# Patient Record
Sex: FEMALE | Race: WHITE | Employment: OTHER | URBAN - NONMETROPOLITAN AREA
[De-identification: names, ages, dates, MRNs, and addresses within clinical notes are randomized per-mention and may not be internally consistent; named-entity substitution may affect disease eponyms.]

---

## 2017-02-02 ENCOUNTER — OFFICE VISIT (OUTPATIENT)
Dept: FAMILY MEDICINE | Facility: OTHER | Age: 82
End: 2017-02-02
Attending: FAMILY MEDICINE
Payer: MEDICARE

## 2017-02-02 VITALS
SYSTOLIC BLOOD PRESSURE: 134 MMHG | TEMPERATURE: 97.6 F | RESPIRATION RATE: 16 BRPM | DIASTOLIC BLOOD PRESSURE: 72 MMHG | WEIGHT: 127.2 LBS | HEART RATE: 73 BPM | OXYGEN SATURATION: 95 % | HEIGHT: 62 IN | BODY MASS INDEX: 23.41 KG/M2

## 2017-02-02 DIAGNOSIS — I10 ESSENTIAL HYPERTENSION: ICD-10-CM

## 2017-02-02 DIAGNOSIS — F03.90 DEMENTIA WITHOUT BEHAVIORAL DISTURBANCE, UNSPECIFIED DEMENTIA TYPE: Primary | ICD-10-CM

## 2017-02-02 DIAGNOSIS — Z51.5 END OF LIFE CARE: ICD-10-CM

## 2017-02-02 LAB
ALBUMIN SERPL-MCNC: 3.7 G/DL (ref 3.4–5)
ALBUMIN UR-MCNC: NEGATIVE MG/DL
ALP SERPL-CCNC: 78 U/L (ref 40–150)
ALT SERPL W P-5'-P-CCNC: 19 U/L (ref 0–50)
ANION GAP SERPL CALCULATED.3IONS-SCNC: 8 MMOL/L (ref 3–14)
APPEARANCE UR: CLEAR
AST SERPL W P-5'-P-CCNC: 18 U/L (ref 0–45)
BASOPHILS # BLD AUTO: 0 10E9/L (ref 0–0.2)
BASOPHILS NFR BLD AUTO: 0.4 %
BILIRUB SERPL-MCNC: 0.5 MG/DL (ref 0.2–1.3)
BILIRUB UR QL STRIP: NEGATIVE
BUN SERPL-MCNC: 18 MG/DL (ref 7–30)
CALCIUM SERPL-MCNC: 8.9 MG/DL (ref 8.5–10.1)
CHLORIDE SERPL-SCNC: 106 MMOL/L (ref 94–109)
CO2 SERPL-SCNC: 26 MMOL/L (ref 20–32)
COLOR UR AUTO: YELLOW
CREAT SERPL-MCNC: 0.77 MG/DL (ref 0.52–1.04)
DIFFERENTIAL METHOD BLD: NORMAL
EOSINOPHIL # BLD AUTO: 0.2 10E9/L (ref 0–0.7)
EOSINOPHIL NFR BLD AUTO: 3.8 %
ERYTHROCYTE [DISTWIDTH] IN BLOOD BY AUTOMATED COUNT: 13.3 % (ref 10–15)
GFR SERPL CREATININE-BSD FRML MDRD: 71 ML/MIN/1.7M2
GLUCOSE SERPL-MCNC: 96 MG/DL (ref 70–99)
GLUCOSE UR STRIP-MCNC: NEGATIVE MG/DL
HCT VFR BLD AUTO: 37 % (ref 35–47)
HGB BLD-MCNC: 12.2 G/DL (ref 11.7–15.7)
HGB UR QL STRIP: NEGATIVE
KETONES UR STRIP-MCNC: NEGATIVE MG/DL
LEUKOCYTE ESTERASE UR QL STRIP: NEGATIVE
LYMPHOCYTES # BLD AUTO: 1.4 10E9/L (ref 0.8–5.3)
LYMPHOCYTES NFR BLD AUTO: 26.1 %
MCH RBC QN AUTO: 30.2 PG (ref 26.5–33)
MCHC RBC AUTO-ENTMCNC: 33 G/DL (ref 31.5–36.5)
MCV RBC AUTO: 92 FL (ref 78–100)
MONOCYTES # BLD AUTO: 0.5 10E9/L (ref 0–1.3)
MONOCYTES NFR BLD AUTO: 9.5 %
NEUTROPHILS # BLD AUTO: 3.3 10E9/L (ref 1.6–8.3)
NEUTROPHILS NFR BLD AUTO: 60.2 %
NITRATE UR QL: NEGATIVE
PH UR STRIP: 6 PH (ref 5–7)
PLATELET # BLD AUTO: 185 10E9/L (ref 150–450)
POTASSIUM SERPL-SCNC: 3.8 MMOL/L (ref 3.4–5.3)
PROT SERPL-MCNC: 7.2 G/DL (ref 6.8–8.8)
RBC # BLD AUTO: 4.04 10E12/L (ref 3.8–5.2)
SODIUM SERPL-SCNC: 140 MMOL/L (ref 133–144)
SP GR UR STRIP: 1.02 (ref 1–1.03)
URN SPEC COLLECT METH UR: NORMAL
UROBILINOGEN UR STRIP-ACNC: 0.2 EU/DL (ref 0.2–1)
WBC # BLD AUTO: 5.5 10E9/L (ref 4–11)

## 2017-02-02 PROCEDURE — 80053 COMPREHEN METABOLIC PANEL: CPT | Performed by: FAMILY MEDICINE

## 2017-02-02 PROCEDURE — 99214 OFFICE O/P EST MOD 30 MIN: CPT | Performed by: FAMILY MEDICINE

## 2017-02-02 PROCEDURE — 36415 COLL VENOUS BLD VENIPUNCTURE: CPT | Performed by: FAMILY MEDICINE

## 2017-02-02 PROCEDURE — 85025 COMPLETE CBC W/AUTO DIFF WBC: CPT | Performed by: FAMILY MEDICINE

## 2017-02-02 PROCEDURE — 81003 URINALYSIS AUTO W/O SCOPE: CPT | Performed by: FAMILY MEDICINE

## 2017-02-02 PROCEDURE — 99212 OFFICE O/P EST SF 10 MIN: CPT

## 2017-02-02 NOTE — MR AVS SNAPSHOT
"              After Visit Summary   2/2/2017    Tai aGrrett    MRN: [id number]           Patient Information     Date Of Birth          11/24/1927        Visit Information        Provider Department      2/2/2017 9:45 AM Yousuf Sanchez MD [facility]        Today's Diagnoses     Dementia without behavioral disturbance, unspecified dementia type    -  1     Essential hypertension         End of life care           Care Instructions    F/u with ongoing concerns.         Follow-ups after your visit        Who to contact     If you have questions or need follow up information about today's clinic visit or your schedule please contact [facility] directly at [phone].  Normal or non-critical lab and imaging results will be communicated to you by MyChart, letter or phone within 4 business days after the clinic has received the results. If you do not hear from us within 7 days, please contact the clinic through MyChart or phone. If you have a critical or abnormal lab result, we will notify you by phone as soon as possible.  Submit refill requests through [employer] or call your pharmacy and they will forward the refill request to us. Please allow 3 business days for your refill to be completed.          Additional Information About Your Visit        Care EveryWhere ID     This is your Care EveryWhere ID. This could be used by other organizations to access your [place] medical records  [id number]        Your Vitals Were     Pulse Temperature Respirations Height BMI (Body Mass Index) Pulse Oximetry    73 97.6  F (36.4  C) (Tympanic) 16 5' 2\" (1.575 m) 23.26 kg/m2 95%       Blood Pressure from Last 3 Encounters:   02/02/17 134/72   11/25/16 162/100   08/24/16 134/84    Weight from Last 3 Encounters:   02/02/17 127 lb 3.2 oz (57.698 kg)   08/24/16 136 lb (61.689 kg)   05/31/16 141 lb (63.957 kg)              We Performed the Following     *UA reflex to Microscopic and Culture     CBC " with platelets and differential     Comprehensive metabolic panel (BMP + Alb, Alk Phos, ALT, AST, Total. Bili, TP)     DNR/DNI        Primary Care Provider Office Phone # Fax #    Yousuf Sanchez [phone]       [facility] [address]  [facility] [address]        Thank you!     Thank you for choosing [facility]  for your care. Our goal is always to provide you with excellent care. Hearing back from our patients is one way we can continue to improve our services. Please take a few minutes to complete the written survey that you may receive in the mail after your visit with us. Thank you!             Your Updated Medication List - Protect others around you: Learn how to safely use, store and throw away your medicines at www.disposemymeds.org.          This list is accurate as of: 2/2/17 12:34 PM.  Always use your most recent med list.                   Brand Name Dispense Instructions for use    fluticasone 50 MCG/ACT spray    FLONASE    16 g    Spray 2 sprays into both nostrils daily       ZANTAC PO      Take by mouth daily

## 2017-02-02 NOTE — PROGRESS NOTES
Tia Garrett    February 2, 2017    Chief Complaint   Patient presents with     Memory Loss     Pt is having increased trouble with memory.     Abdominal Pain     Pt has daily GI problems. Pt has pian today, but does not usually have pain.     Derm Problem     Pt has dry sknin on her hands.       SUBJECTIVE:  Here for a discussion.  Memory has been worsening.  Lives in adjacent home to son and wife.  Long discussion today.  She cannot tell me the season, president, or town.  She does not drive.  Failed medication for memory attempts in past.  Having some on and off abdominal pains, relatively mild.  Had been leaving gas on on stove, and now they have LP alarm present.  Overall just worsening.  See below.      Past Medical History   Diagnosis Date     Hiatal hernia        Past Surgical History   Procedure Laterality Date     Appendectomy open       at age 16     Hysterectomy total abdominal, bilateral salpingo-oophorectomy, combined       Orthopedic surgery  07/02/2015     right hip surgery       Current Outpatient Prescriptions   Medication Sig Dispense Refill     RaNITidine HCl (ZANTAC PO) Take by mouth daily       fluticasone (FLONASE) 50 MCG/ACT nasal spray Spray 2 sprays into both nostrils daily 16 g 3       No Known Allergies    Family History   Problem Relation Age of Onset     CANCER Father      lung cancer     Heart Failure Mother        Social History     Social History     Marital Status:      Spouse Name: N/A     Number of Children: N/A     Years of Education: N/A     Occupational History     Not on file.     Social History Main Topics     Smoking status: Never Smoker      Smokeless tobacco: Never Used     Alcohol Use: No     Drug Use: No     Sexual Activity: Not on file     Other Topics Concern     Parent/Sibling W/ Cabg, Mi Or Angioplasty Before 65f 55m? No     Social History Narrative       5 point ROS negative except as noted above in HPI, including Gen., Resp., CV, GI &  system review.      OBJECTIVE:  B/P: 134/72, T: 97.6, P: 73, R: 16    GENERAL APPEARANCE: Alert, no acute distress  CV: regular rate and rhythm, no murmur, rub or gallop  RESP: lungs clear to auscultation bilaterally  ABDOMEN: normal bowel sounds, soft, nontender, no hepatosplenomegaly or other masses  SKIN: no suspicious lesions or rashes to visualized skin  NEURO: Alert, not oriented ([place], [place], no idea who the president is or was recently), speech normal.     ASSESSMENT and PLAN:  (F03.90) Dementia without behavioral disturbance, unspecified dementia type  (primary encounter diagnosis)  Comment: ongoing, progressing.    Plan: *UA reflex to Microscopic and Culture, CBC with        platelets and differential, Comprehensive         metabolic panel (BMP + Alb, Alk Phos, ALT, AST,        Total. Bili, TP)        R/o medical cause with labs.  Discussed abdominal pain not clear dx and may need further testing.  For now, simply continue to follow.      (I10) Essential hypertension  Comment: stable.   Plan: n dennis.      (Z51.5) End of life care  Comment: discussed at some length.    Plan: she is very clear, as is daughter in law.  They had a form filled out but it was invalid and now they are going to take care of the paperwork.  She is DNR/DNI.  She would not necessarily be competent right now to make that distinction, but has expressed DNR wishes for several years, and is very clear on this referencing her Hoahaoism (when the good lord wants me to come home I want to go).  This is very clearly appropriate and family desires this as well based on her wishes.   I made her dnr in the orders in the chart.

## 2017-02-10 DIAGNOSIS — K29.70 GASTRITIS: Primary | ICD-10-CM

## 2017-02-11 ENCOUNTER — HOSPITAL ENCOUNTER (EMERGENCY)
Facility: HOSPITAL | Age: 82
Discharge: HOME | End: 2017-02-11
Attending: NURSE PRACTITIONER | Admitting: NURSE PRACTITIONER
Payer: MEDICARE

## 2017-02-11 VITALS
SYSTOLIC BLOOD PRESSURE: 121 MMHG | RESPIRATION RATE: 18 BRPM | DIASTOLIC BLOOD PRESSURE: 79 MMHG | TEMPERATURE: 97.4 F | OXYGEN SATURATION: 94 %

## 2017-02-11 DIAGNOSIS — H04.301 LACRIMAL DUCT INFECTION, RIGHT: ICD-10-CM

## 2017-02-11 PROCEDURE — 99213 OFFICE O/P EST LOW 20 MIN: CPT | Performed by: NURSE PRACTITIONER

## 2017-02-11 PROCEDURE — 99213 OFFICE O/P EST LOW 20 MIN: CPT

## 2017-02-11 RX ORDER — POLYMYXIN B SULFATE AND TRIMETHOPRIM 1; 10000 MG/ML; [USP'U]/ML
2 SOLUTION OPHTHALMIC 4 TIMES DAILY
Qty: 1 BOTTLE | Refills: 0 | Status: SHIPPED | OUTPATIENT
Start: 2017-02-11 | End: 2017-07-31

## 2017-02-11 ASSESSMENT — ENCOUNTER SYMPTOMS
PHOTOPHOBIA: 0
APPETITE CHANGE: 0
DIAPHORESIS: 0
SINUS PRESSURE: 0
FEVER: 0
COUGH: 0
EYE REDNESS: 0
RHINORRHEA: 0
SORE THROAT: 0
EYE PAIN: 0
FATIGUE: 0

## 2017-02-11 NOTE — ED AVS SNAPSHOT
[facility]    [address] [address]    Phone:  [phone]                                       Tia Garrett   MRN: [id number]    Department:  [facility]   Date of Visit:  2/11/2017           After Visit Summary Signature Page     I have received my discharge instructions, and my questions have been answered. I have discussed any challenges I see with this plan with the nurse or doctor.    ..........................................................................................................................................  Patient/Patient Representative Signature      ..........................................................................................................................................  Patient Representative Print Name and Relationship to Patient    ..................................................               ................................................  Date                                            Time    ..........................................................................................................................................  Reviewed by Signature/Title    ...................................................              ..............................................  Date                                                            Time

## 2017-02-11 NOTE — ED NOTES
Right eye bothersome starting yesterday. Red swelling to inner corner of eye. No pain or itching, denies any vision changes. Discomfort to swelling outside of eye.

## 2017-02-11 NOTE — ED AVS SNAPSHOT
[facility]    [address] [address]    Phone:  [phone]                                       Tia Garrett   MRN: [id number]    Department:  [facility]   Date of Visit:  2/11/2017           Patient Information     Date Of Birth          11/24/1927        Your diagnoses for this visit were:     Lacrimal duct infection, right        You were seen by Suzie Red NP.      Follow-up Information     Follow up with [facility].    Specialty:  EMERGENCY MEDICINE    Why:  As needed, If symptoms worsen, or concerns develop    Contact information:    [address] [address] [phone]    Additional information:    From [place]: Take [address]. Turn left at [address]. Turn left at the first stoplight on [address]. At the first stop sign, take a right onto [address]. Take a left into the parking lot and continue through until you reach the North enterance of the building.       From [place]: Take [address]. Take the [address] ramp towards [place]. Turn left onto [address]. Take a slight right onto [address]. Turn left at the first stoplight on [address]. At the first stop sign, take a right onto [address]. Take a left into the parking lot and continue through until you reach the North enterance of the building.       From [place]: Take [address] South. Take a right at [address]. At the first stop sign, take a right onto [address]. Take a left into the parking lot and continue through until you reach the North enterance of the building.         Follow up with Yousuf Sanchez MD.    Specialty:  Family Practice    Why:  As needed, if symptoms do not improve    Contact information:    [address]  [place] [address]  [phone]          Discharge Instructions         Infected Tear Sac  (Dacryocystitis)    Dacryocystitis is an infection of the tear sac. The tear sac is the narrow pouch where tears from the eye drain before they flow into the nose. You have 1 tear sac for each eye.  Tears moisten and clean the eyes. They are made in a gland under the upper eyelids. Tiny tubes called tear ducts drain excess tears away from the eyes. The tears flow into the tear sacs, and then into the nose.  In some cases, a tear sac can get infected. This can happen if the tear duct is blocked. A blocked duct can happen for many reasons. It may be caused by an injury to the nose or eye. Or the duct may have narrowed on its own. A blocked tear duct can t drain tears. Bacteria may then become trapped in the duct and in the tear sac.  Dacryocystitis can cause redness, swelling, and pain just below the lower lid, near the nose. A fluid (pus) may drain from the eye. Antibiotics are used to treat the infection and stop it from spreading. If the infection doesn t go away, or comes back often, minor surgery may be needed to open the duct.  Home care  Follow these guidelines when caring for yourself at home:    You will be given antibiotic medicine to treat the infection. Follow all instructions for taking this medication. It may be taken by mouth. Or it may be eye drops or eye ointment. If you have pain, you may use acetaminophen or ibuprofen to reduce pain and fever. (Note: If you have chronic liver or kidney disease, or you have ever had a stomach ulcer or gastrointestinal bleeding, talk with your health care provider before using these medicines.)    Wash your hands before caring for your eye.    Several times a day, apply a warm compress for 1 to 2 minutes. A warm compress is a clean towel damp with warm water.    After the warm compress, massage the tear sac with clean hands. Place your index finger between the corner of the eye and the nose. Your finger should be pointing toward the top of the nose. Gently massage  from the nose toward the eye. A small amount of tear fluid or pus may appear in the corner of the eye.  Using eye drops  Apply drops in the corner of the eye, where the eyelid meets the nose. The drops will pool in this area. When you blink or open your eyelid, the drops will flow into the eye. Use the exact number of drops prescribed. Be careful not to touch your eye or eyelashes with the dropper.  Using ointment  If both drops and ointment are prescribed, use the drops first. Wait 3 minutes, and then apply the ointment. Doing this will give each medicine time to work. To apply the ointment, start by gently pulling down your lower lid. Place a thin line of ointment along the inside of the lid. Begin at the nose and move outward. Close the lid. Wipe away excess medicine from the nose area outward. This is to keep the eyes as clean as possible. Keep your eye closed for 1 or 2 minutes so the medicine has time to coat the eye. Eye ointment may cause blurry vision. This is normal. It may help to apply ointment at bedtime instead of during the day.  Follow-up care  Follow up with your health care provider, or as advised.  When to seek medical advice  Call your health care provider right away if any of these occur.    Fever of 100.4 F (38 C) or higher after 2 days of antibiotic treatment    You are pregnant    You just had surgery or another medical procedure, or were just discharged from the hospital    Symptoms don t get better, or get worse    8171-6624 [employer]. [address] [place] [address], [place] [address]. All rights reserved. This information is not intended as a substitute for professional medical care. Always follow your healthcare professional's instructions.             Review of your medicines      START taking        Dose / Directions Last dose taken    amoxicillin-clavulanate 875-125 MG per tablet   Commonly known as:  AUGMENTIN   Dose:  1 tablet   Quantity:  20 tablet        Take 1 tablet by  mouth 2 times daily for 10 days   Refills:  0        trimethoprim-polymyxin b ophthalmic solution   Commonly known as:  POLYTRIM   Dose:  2 drop   Quantity:  1 Bottle        Place 2 drops Into the left eye 4 times daily   Refills:  0          Our records show that you are taking the medicines listed below. If these are incorrect, please call your family doctor or clinic.        Dose / Directions Last dose taken    fluticasone 50 MCG/ACT spray   Commonly known as:  FLONASE   Dose:  2 spray   Quantity:  16 g        Spray 2 sprays into both nostrils daily   Refills:  3        ZANTAC PO        Take by mouth daily   Refills:  0                Prescriptions were sent or printed at these locations (2 Prescriptions)                   [facility] - [place], [place] - [address] AT [facility] [address], [place] [address]    Telephone:  [phone]   Fax:  [phone]   Hours:                  E-Prescribed (2 of 2)         amoxicillin-clavulanate (AUGMENTIN) 875-125 MG per tablet               trimethoprim-polymyxin b (POLYTRIM) ophthalmic solution                Orders Needing Specimen Collection     None      Pending Results     No orders found from 2/10/2017 to 2/12/2017.            Pending Culture Results     No orders found from 2/10/2017 to 2/12/2017.            Thank you for choosing [facility]       Thank you for choosing [facility] for your care. Our goal is always to provide you with excellent care. Hearing back from our patients is one way we can continue to improve our services. Please take a few minutes to complete the written survey that you may receive in the mail after you visit with us. Thank you!        Care EveryWhere ID     This is your Care EveryWhere ID. This could be used by other organizations to access your [facility] medical records  [id number]        After Visit Summary       This is your record. Keep this with you and show to your community pharmacist(s) and  doctor(s) at your next visit.

## 2017-02-11 NOTE — DISCHARGE INSTRUCTIONS
Infected Tear Sac (Dacryocystitis)    Dacryocystitis is an infection of the tear sac. The tear sac is the narrow pouch where tears from the eye drain before they flow into the nose. You have 1 tear sac for each eye.  Tears moisten and clean the eyes. They are made in a gland under the upper eyelids. Tiny tubes called tear ducts drain excess tears away from the eyes. The tears flow into the tear sacs, and then into the nose.  In some cases, a tear sac can get infected. This can happen if the tear duct is blocked. A blocked duct can happen for many reasons. It may be caused by an injury to the nose or eye. Or the duct may have narrowed on its own. A blocked tear duct can t drain tears. Bacteria may then become trapped in the duct and in the tear sac.  Dacryocystitis can cause redness, swelling, and pain just below the lower lid, near the nose. A fluid (pus) may drain from the eye. Antibiotics are used to treat the infection and stop it from spreading. If the infection doesn t go away, or comes back often, minor surgery may be needed to open the duct.  Home care  Follow these guidelines when caring for yourself at home:    You will be given antibiotic medicine to treat the infection. Follow all instructions for taking this medication. It may be taken by mouth. Or it may be eye drops or eye ointment. If you have pain, you may use acetaminophen or ibuprofen to reduce pain and fever. (Note: If you have chronic liver or kidney disease, or you have ever had a stomach ulcer or gastrointestinal bleeding, talk with your health care provider before using these medicines.)    Wash your hands before caring for your eye.    Several times a day, apply a warm compress for 1 to 2 minutes. A warm compress is a clean towel damp with warm water.    After the warm compress, massage the tear sac with clean hands. Place your index finger between the corner of the eye and the nose. Your finger should be pointing toward the top of the  nose. Gently massage from the nose toward the eye. A small amount of tear fluid or pus may appear in the corner of the eye.  Using eye drops  Apply drops in the corner of the eye, where the eyelid meets the nose. The drops will pool in this area. When you blink or open your eyelid, the drops will flow into the eye. Use the exact number of drops prescribed. Be careful not to touch your eye or eyelashes with the dropper.  Using ointment  If both drops and ointment are prescribed, use the drops first. Wait 3 minutes, and then apply the ointment. Doing this will give each medicine time to work. To apply the ointment, start by gently pulling down your lower lid. Place a thin line of ointment along the inside of the lid. Begin at the nose and move outward. Close the lid. Wipe away excess medicine from the nose area outward. This is to keep the eyes as clean as possible. Keep your eye closed for 1 or 2 minutes so the medicine has time to coat the eye. Eye ointment may cause blurry vision. This is normal. It may help to apply ointment at bedtime instead of during the day.  Follow-up care  Follow up with your health care provider, or as advised.  When to seek medical advice  Call your health care provider right away if any of these occur.    Fever of 100.4 F (38 C) or higher after 2 days of antibiotic treatment    You are pregnant    You just had surgery or another medical procedure, or were just discharged from the hospital    Symptoms don t get better, or get worse    2895-6547 [employer]. [address] [place] [address], [place] [address]. All rights reserved. This information is not intended as a substitute for professional medical care. Always follow your healthcare professional's instructions.

## 2017-02-11 NOTE — ED PROVIDER NOTES
History     Chief Complaint   Patient presents with     Eye Infection     right eye with green matter to lower lid, tearing. denies pain. no vision changes. reddened and swelling noted to inner corner of right eye.      The history is provided by the patient. No  was used.     Tia Garrett is a 89 year old female who presents today with a CC of right inner canthus swelling and redness.  She denies injury to the area.  She denies tearing of the eye.  Swollen area is tender to palpation.  No fevers or chills.  Appetite has been good.  No acute vision changes to the right eye.      I have reviewed the Medications, Allergies, Past Medical and Surgical History, and Social History in the Epic system.    Review of Systems   Constitutional: Negative for fever, diaphoresis, appetite change and fatigue.   HENT: Negative for congestion, rhinorrhea, sinus pressure and sore throat.    Eyes: Negative for photophobia, pain, redness (no eyeball redness) and visual disturbance.   Respiratory: Negative for cough.        Physical Exam   BP: 121/79 mmHg  Heart Rate: 85  Temp: 97.4  F (36.3  C)  Resp: 18  SpO2: 94 %    Physical Exam   Constitutional: She appears well-developed and well-nourished.   HENT:   Head: Normocephalic and atraumatic.   Eyes: Conjunctivae and EOM are normal. Pupils are equal, round, and reactive to light.       Cardiovascular: Normal rate.    Pulmonary/Chest: Effort normal.   Neurological: She is alert.   Skin: Skin is warm and dry.   Psychiatric: She has a normal mood and affect. Her behavior is normal.   Nursing note and vitals reviewed.      ED Course   Procedures    Assessments & Plan (with Medical Decision Making)     I have reviewed the nursing notes.    I have reviewed the findings, diagnosis, plan and need for follow up with the patient.    Consulted with Dr Rocha, Ophthalmologist who advised warm compresses to right eye, start patient on Augmentin and abx eye drop, any drop  will be ok.  This is a lacrimal duct infection but duct appears patent, reassuring that duct is draining.   Patient can follow up with Ophthalmology, non-emergent in clinic this week.  Thank you Dr Rocha for your consultation.      ASSESSMENT / PLAN:  (H04.301) Lacrimal duct infection, right  Comment: advised patient and daughter of above recommendations  Plan:  Plan as above   Augmentin as prescribed   Patient and daughter verbally educated and given appropriate education sheets for their diagnoses and has no questions.   Take medications as directed.    Return to ED/UC if symptoms increase or concerns develop: red flag symptoms as discussed and per discharge instructions   Call Monday morning to schedule follow up with Dr Rocha in clinic this week.      Discharge Medication List as of 2/11/2017 10:53 AM      START taking these medications    Details   amoxicillin-clavulanate (AUGMENTIN) 875-125 MG per tablet Take 1 tablet by mouth 2 times daily for 10 days, Disp-20 tablet, R-0, E-Prescribe      trimethoprim-polymyxin b (POLYTRIM) ophthalmic solution Place 2 drops Into the left eye 4 times daily, Disp-1 Bottle, R-0, E-Prescribe             Final diagnoses:   Lacrimal duct infection, right       2/11/2017   [facility]       Suzie Red NP  02/14/17 0798

## 2017-04-29 ENCOUNTER — HOSPITAL ENCOUNTER (EMERGENCY)
Facility: HOSPITAL | Age: 82
Discharge: HOME | End: 2017-04-29
Payer: MEDICARE

## 2017-04-29 VITALS
RESPIRATION RATE: 16 BRPM | OXYGEN SATURATION: 97 % | SYSTOLIC BLOOD PRESSURE: 180 MMHG | HEART RATE: 82 BPM | DIASTOLIC BLOOD PRESSURE: 117 MMHG | TEMPERATURE: 97.7 F

## 2017-04-29 DIAGNOSIS — N39.0 UTI (URINARY TRACT INFECTION), UNCOMPLICATED: ICD-10-CM

## 2017-04-29 LAB
ALBUMIN SERPL-MCNC: 4.3 G/DL (ref 3.4–5)
ALBUMIN UR-MCNC: 100 MG/DL
ALP SERPL-CCNC: 86 U/L (ref 40–150)
ALT SERPL W P-5'-P-CCNC: 16 U/L (ref 0–50)
ANION GAP SERPL CALCULATED.3IONS-SCNC: 10 MMOL/L (ref 3–14)
APPEARANCE UR: ABNORMAL
AST SERPL W P-5'-P-CCNC: 18 U/L (ref 0–45)
BACTERIA #/AREA URNS HPF: ABNORMAL /HPF
BASOPHILS # BLD AUTO: 0 10E9/L (ref 0–0.2)
BASOPHILS NFR BLD AUTO: 0.3 %
BILIRUB SERPL-MCNC: 0.6 MG/DL (ref 0.2–1.3)
BILIRUB UR QL STRIP: NEGATIVE
BUN SERPL-MCNC: 20 MG/DL (ref 7–30)
CALCIUM SERPL-MCNC: 8.7 MG/DL (ref 8.5–10.1)
CHLORIDE SERPL-SCNC: 102 MMOL/L (ref 94–109)
CO2 SERPL-SCNC: 27 MMOL/L (ref 20–32)
COLOR UR AUTO: YELLOW
CREAT SERPL-MCNC: 0.8 MG/DL (ref 0.52–1.04)
DIFFERENTIAL METHOD BLD: NORMAL
EOSINOPHIL # BLD AUTO: 0.2 10E9/L (ref 0–0.7)
EOSINOPHIL NFR BLD AUTO: 2.8 %
ERYTHROCYTE [DISTWIDTH] IN BLOOD BY AUTOMATED COUNT: 12.9 % (ref 10–15)
GFR SERPL CREATININE-BSD FRML MDRD: 68 ML/MIN/1.7M2
GLUCOSE SERPL-MCNC: 109 MG/DL (ref 70–99)
GLUCOSE UR STRIP-MCNC: NEGATIVE MG/DL
HCT VFR BLD AUTO: 40.7 % (ref 35–47)
HGB BLD-MCNC: 13.6 G/DL (ref 11.7–15.7)
HGB UR QL STRIP: NEGATIVE
HYALINE CASTS #/AREA URNS LPF: 38 /LPF (ref 0–2)
IMM GRANULOCYTES # BLD: 0 10E9/L (ref 0–0.4)
IMM GRANULOCYTES NFR BLD: 0.3 %
KETONES UR STRIP-MCNC: NEGATIVE MG/DL
LEUKOCYTE ESTERASE UR QL STRIP: ABNORMAL
LYMPHOCYTES # BLD AUTO: 1.2 10E9/L (ref 0.8–5.3)
LYMPHOCYTES NFR BLD AUTO: 18.1 %
MCH RBC QN AUTO: 30.3 PG (ref 26.5–33)
MCHC RBC AUTO-ENTMCNC: 33.4 G/DL (ref 31.5–36.5)
MCV RBC AUTO: 91 FL (ref 78–100)
MONOCYTES # BLD AUTO: 0.5 10E9/L (ref 0–1.3)
MONOCYTES NFR BLD AUTO: 7.8 %
MUCOUS THREADS #/AREA URNS LPF: PRESENT /LPF
NEUTROPHILS # BLD AUTO: 4.7 10E9/L (ref 1.6–8.3)
NEUTROPHILS NFR BLD AUTO: 70.7 %
NITRATE UR QL: NEGATIVE
NRBC # BLD AUTO: 0 10*3/UL
NRBC BLD AUTO-RTO: 0 /100
PH UR STRIP: 6 PH (ref 4.7–8)
PLATELET # BLD AUTO: 241 10E9/L (ref 150–450)
POTASSIUM SERPL-SCNC: 3.5 MMOL/L (ref 3.4–5.3)
PROT SERPL-MCNC: 7.9 G/DL (ref 6.8–8.8)
RBC # BLD AUTO: 4.49 10E12/L (ref 3.8–5.2)
RBC #/AREA URNS AUTO: 2 /HPF (ref 0–2)
SODIUM SERPL-SCNC: 139 MMOL/L (ref 133–144)
SP GR UR STRIP: 1.01 (ref 1–1.03)
SQUAMOUS #/AREA URNS AUTO: 1 /HPF (ref 0–1)
URN SPEC COLLECT METH UR: ABNORMAL
UROBILINOGEN UR STRIP-MCNC: NORMAL MG/DL (ref 0–2)
WBC # BLD AUTO: 6.7 10E9/L (ref 4–11)
WBC #/AREA URNS AUTO: 5 /HPF (ref 0–2)

## 2017-04-29 PROCEDURE — 99284 EMERGENCY DEPT VISIT MOD MDM: CPT

## 2017-04-29 PROCEDURE — 96361 HYDRATE IV INFUSION ADD-ON: CPT

## 2017-04-29 PROCEDURE — 25000128 H RX IP 250 OP 636

## 2017-04-29 PROCEDURE — 99284 EMERGENCY DEPT VISIT MOD MDM: CPT | Mod: 25

## 2017-04-29 PROCEDURE — 96374 THER/PROPH/DIAG INJ IV PUSH: CPT

## 2017-04-29 PROCEDURE — 80053 COMPREHEN METABOLIC PANEL: CPT

## 2017-04-29 PROCEDURE — 25000132 ZZH RX MED GY IP 250 OP 250 PS 637: Mod: GY

## 2017-04-29 PROCEDURE — A9270 NON-COVERED ITEM OR SERVICE: HCPCS | Mod: GY

## 2017-04-29 PROCEDURE — 36415 COLL VENOUS BLD VENIPUNCTURE: CPT

## 2017-04-29 PROCEDURE — 85025 COMPLETE CBC W/AUTO DIFF WBC: CPT

## 2017-04-29 PROCEDURE — 81001 URINALYSIS AUTO W/SCOPE: CPT

## 2017-04-29 RX ORDER — NITROFURANTOIN 25; 75 MG/1; MG/1
100 CAPSULE ORAL 2 TIMES DAILY
Qty: 5 CAPSULE | Refills: 0 | Status: SHIPPED | OUTPATIENT
Start: 2017-04-29 | End: 2017-07-31

## 2017-04-29 RX ORDER — ACETAMINOPHEN 325 MG/1
650 TABLET ORAL ONCE
Status: COMPLETED | OUTPATIENT
Start: 2017-04-29 | End: 2017-04-29

## 2017-04-29 RX ORDER — SODIUM CHLORIDE 9 MG/ML
INJECTION, SOLUTION INTRAVENOUS CONTINUOUS
Status: DISCONTINUED | OUTPATIENT
Start: 2017-04-29 | End: 2017-04-29 | Stop reason: HOSPADM

## 2017-04-29 RX ORDER — NITROFURANTOIN 25; 75 MG/1; MG/1
100 CAPSULE ORAL ONCE
Status: COMPLETED | OUTPATIENT
Start: 2017-04-29 | End: 2017-04-29

## 2017-04-29 RX ORDER — ONDANSETRON 2 MG/ML
4 INJECTION INTRAMUSCULAR; INTRAVENOUS EVERY 30 MIN PRN
Status: DISCONTINUED | OUTPATIENT
Start: 2017-04-29 | End: 2017-04-29 | Stop reason: HOSPADM

## 2017-04-29 RX ADMIN — NITROFURANTOIN (MONOHYDRATE/MACROCRYSTALS) 100 MG: 75; 25 CAPSULE ORAL at 11:23

## 2017-04-29 RX ADMIN — ACETAMINOPHEN 650 MG: 325 TABLET, FILM COATED ORAL at 11:22

## 2017-04-29 RX ADMIN — SODIUM CHLORIDE: 9 INJECTION, SOLUTION INTRAVENOUS at 10:31

## 2017-04-29 RX ADMIN — ONDANSETRON 4 MG: 2 INJECTION INTRAMUSCULAR; INTRAVENOUS at 10:31

## 2017-04-29 ASSESSMENT — ENCOUNTER SYMPTOMS
NECK STIFFNESS: 0
APPETITE CHANGE: 1
ABDOMINAL PAIN: 0
DYSURIA: 1
FREQUENCY: 1
FEVER: 0
EYE REDNESS: 0
DIFFICULTY URINATING: 0
ARTHRALGIAS: 0
NAUSEA: 1
COLOR CHANGE: 0
HEADACHES: 0
BACK PAIN: 1
SHORTNESS OF BREATH: 0
CONFUSION: 0

## 2017-04-29 NOTE — ED NOTES
Discharge instructions completed with patient and care giver at bedside with no questions or concerns. Both individuals aware of prescription to be picked up. Vital signs stable prior to discharge.

## 2017-04-29 NOTE — DISCHARGE INSTRUCTIONS
"See attached for home care  Increase fluids  Tylenol for pain  F/U with PCP if not improving or back to base line in 1 week  Return to ED/UC with worsening symptoms.         * BLADDER INFECTION,Female (Adult)    A bladder infection (\"cystitis\" or \"UTI\") usually causes a constant urge to urinate and a burning when passing urine. Urine may be cloudy, smelly or dark. There may be pain in the lower abdomen. A bladder infection occurs when bacteria from the vaginal area enter the bladder opening (urethra). This can occur from sexual intercourse, wearing tight clothing, dehydration and other factors.  HOME CARE:  1. Drink lots of fluids (at least 6-8 glasses a day, unless you must restrict fluids for other medical reasons). This will force the medicine into your urinary system and flush the bacteria out of your body. Cranberry juice has been shown to help clear out the bacteria.  2. Avoid sexual intercourse until your symptoms are gone.  3. A bladder infection is treated with antibiotics. You may also be given Pyridium (generic = phenazopyridine) to reduce the burning sensation. This medicine will cause your urine to become a bright orange color. The orange urine may stain clothing. You may wear a pad or panty-liner to protect clothing.  PREVENTING FUTURE INFECTIONS:  1. Always wipe from front to back after a bowel movement.  2. Keep the genital area clean and dry.  3. Drink plenty of fluids each day to avoid dehydration.  4. Urinate right after intercourse to flush out the bladder.  5. Wear cotton underwear and cotton-lined panty hose; avoid tight-fitting pants.  6. If you are on birth control pills and are having frequent bladder infections, discuss with your doctor.  FOLLOW UP: Return to this facility or see your doctor if ALL symptoms are not gone after three days of treatment.  GET PROMPT MEDICAL ATTENTION if any of the following occur:    Fever over 101 F (38.3 C)    No improvement by the third day of " treatment    Increasing back or abdominal pain    Repeated vomiting; unable to keep medicine down    Weakness, dizziness or fainting    Vaginal discharge    Pain, redness or swelling in the labia (outer vaginal area)    7960-8279 [employer], [address] [place] [address], [place] [address]. All rights reserved. This information is not intended as a substitute for professional medical care. Always follow your healthcare professional's instructions.

## 2017-04-29 NOTE — ED AVS SNAPSHOT
[facility]    [address] [address]    Phone:  [phone]                                       Tia Garrett   MRN: [id number]    Department:  [facility]   Date of Visit:  4/29/2017           After Visit Summary Signature Page     I have received my discharge instructions, and my questions have been answered. I have discussed any challenges I see with this plan with the nurse or doctor.    ..........................................................................................................................................  Patient/Patient Representative Signature      ..........................................................................................................................................  Patient Representative Print Name and Relationship to Patient    ..................................................               ................................................  Date                                            Time    ..........................................................................................................................................  Reviewed by Signature/Title    ...................................................              ..............................................  Date                                                            Time

## 2017-04-29 NOTE — ED PROVIDER NOTES
History     Chief Complaint   Patient presents with     Nausea     c/o nausea and not feeling well since last night     Urinary Frequency     since last night     Back Pain     c/o low back pain     HPI  Tia PAM Garrett is a 89 year old female with dementia, hx of recurrent UTI, renal lithiasis, lives at home alone with family close by, presents to ED via private car with family for evaluation of urinary frequency, dysuria, low back pain, nausea. Daughter in law reports to mechanical trip and fall 5 days ago. States she slipped on ice, fell onto buttocks. No apparent injury, pain or deficit noted at the time. Onset of nausea, dysuria last night, progressing throughout the night. Denies fever, denies flank pain, denies vomiting, no diarrhea, no paresthesias. Pt poor historian.     I have reviewed the Medications, Allergies, Past Medical and Surgical History, and Social History in the Epic system.    Review of Systems   Constitutional: Positive for appetite change. Negative for fever.   HENT: Negative for congestion.    Eyes: Negative for redness.   Respiratory: Negative for shortness of breath.    Cardiovascular: Negative for chest pain.   Gastrointestinal: Positive for nausea. Negative for abdominal pain.   Genitourinary: Positive for dysuria, frequency and urgency. Negative for difficulty urinating.   Musculoskeletal: Positive for back pain. Negative for arthralgias and neck stiffness.   Skin: Negative for color change.   Neurological: Negative for headaches.   Psychiatric/Behavioral: Negative for confusion.       Physical Exam   BP: 140/92  Heart Rate: 93  Temp: 97.6  F (36.4  C)  Resp: 18  SpO2: 98 %  Physical Exam   Constitutional: She is oriented to person, place, and time. No distress.   HENT:   Head: Normocephalic and atraumatic.   Mouth/Throat: Oropharynx is clear and moist.   Eyes: Pupils are equal, round, and reactive to light.   Neck: Normal range of motion. No thyromegaly present.   Cardiovascular:  Normal rate and regular rhythm.    Pulmonary/Chest: Effort normal and breath sounds normal. No respiratory distress.   Abdominal: Soft. There is tenderness in the suprapubic area. There is no rebound, no guarding and no tenderness at McBurney's point.   Musculoskeletal: Normal range of motion. She exhibits tenderness.        Lumbar back: She exhibits tenderness. She exhibits no swelling.        Back:    Neurological: She is alert and oriented to person, place, and time. She has normal strength. No cranial nerve deficit or sensory deficit. GCS eye subscore is 4. GCS verbal subscore is 5. GCS motor subscore is 6.   Reflex Scores:       Patellar reflexes are 2+ on the right side and 2+ on the left side.  Skin: Skin is warm and dry. She is not diaphoretic.   Nursing note and vitals reviewed.      ED Course     Procedures           Labs Ordered and Resulted from Time of ED Arrival Up to the Time of Departure from the ED   UA MACROSCOPIC WITH REFLEX TO MICRO AND CULTURE - Abnormal; Notable for the following:        Result Value    Protein Albumin Urine 100 (*)     Leukocyte Esterase Urine Trace (*)     WBC Urine 5 (*)     Bacteria Urine Few (*)     Mucous Urine Present (*)     Hyaline Casts 38 (*)     All other components within normal limits   COMPREHENSIVE METABOLIC PANEL - Abnormal; Notable for the following:     Glucose 109 (*)     All other components within normal limits   CBC WITH PLATELETS DIFFERENTIAL       Assessments & Plan (with Medical Decision Making)   Pt presents with dysuria, frequency, 5 days s/p mechanical trip and fall. VSS, afebrile. NAD, ambulating well with walker, n/v status intact. Exam as above. Labs obtained, unremarkable. UA shows few bacteria, trace LE, 5 wbc.   Findings consistent with early UTI, lumbar sprain, strain.   Epic discharge instructions given. Pt discharged in stable condition.     I have reviewed the nursing notes.    I have reviewed the findings, diagnosis, plan and need for  follow up with the patient.    Discharge Medication List as of 4/29/2017 11:55 AM      START taking these medications    Details   nitrofurantoin, macrocrystal-monohydrate, (MACROBID) 100 MG capsule Take 1 capsule (100 mg) by mouth 2 times daily, Disp-5 capsule, R-0, E-Prescribe             Final diagnoses:   UTI (urinary tract infection), uncomplicated   See attached for home care  Increase fluids  Tylenol for pain  F/U with PCP if not improving or back to base line in 1 week  Return to ED/UC with worsening symptoms.     4/29/2017   [facility]     Renuka Cormier, CRESENCIO FNP  04/30/17 9221

## 2017-04-29 NOTE — ED AVS SNAPSHOT
[facility]    [address] [address]    Phone:  [phone]                                       Tai Garrett   MRN: [id number]    Department:  [facility]   Date of Visit:  4/29/2017           Patient Information     Date Of Birth          11/24/1927        Your diagnoses for this visit were:     UTI (urinary tract infection), uncomplicated        You were seen by Renuka Cormier APRN FNP.      Follow-up Information     Follow up with Yousuf Sanchez MD In 1 week.    Specialty:  Family Practice    Why:  if not improving or back to baseline    Contact information:     [facility]  [address] [place] [address] [phone]          Follow up with [facility].    Specialty:  EMERGENCY MEDICINE    Why:  As needed, If symptoms worsen    Contact information:    [address] [address] [phone]    Additional information:    From [facility]: Take [address]. Turn left at [address]. Turn left at the first stoplight on [address]. At the first stop sign, take a right onto [address]. Take a left into the parking lot and continue through until you reach the North enterance of the building.       From [place]: Take [address]. Take the [address] ramp towards [place]. Turn left onto [address]. Take a slight right onto [address]. Turn left at the first stoplight on [address]. At the first stop sign, take a right onto [address]. Take a left into the parking lot and continue through until you reach the North enterance of the building.       From [place]: Take [address]. Take a right at [address]. At the first stop sign, take a right onto [address]. Take a left into the parking lot and continue through until you reach the North enterance of the building.         Discharge Instructions       See attached for home  "care  Increase fluids  Tylenol for pain  F/U with PCP if not improving or back to base line in 1 week  Return to ED/UC with worsening symptoms.         * BLADDER INFECTION,Female (Adult)    A bladder infection (\"cystitis\" or \"UTI\") usually causes a constant urge to urinate and a burning when passing urine. Urine may be cloudy, smelly or dark. There may be pain in the lower abdomen. A bladder infection occurs when bacteria from the vaginal area enter the bladder opening (urethra). This can occur from sexual intercourse, wearing tight clothing, dehydration and other factors.  HOME CARE:  1. Drink lots of fluids (at least 6-8 glasses a day, unless you must restrict fluids for other medical reasons). This will force the medicine into your urinary system and flush the bacteria out of your body. Cranberry juice has been shown to help clear out the bacteria.  2. Avoid sexual intercourse until your symptoms are gone.  3. A bladder infection is treated with antibiotics. You may also be given Pyridium (generic = phenazopyridine) to reduce the burning sensation. This medicine will cause your urine to become a bright orange color. The orange urine may stain clothing. You may wear a pad or panty-liner to protect clothing.  PREVENTING FUTURE INFECTIONS:  1. Always wipe from front to back after a bowel movement.  2. Keep the genital area clean and dry.  3. Drink plenty of fluids each day to avoid dehydration.  4. Urinate right after intercourse to flush out the bladder.  5. Wear cotton underwear and cotton-lined panty hose; avoid tight-fitting pants.  6. If you are on birth control pills and are having frequent bladder infections, discuss with your doctor.  FOLLOW UP: Return to this facility or see your doctor if ALL symptoms are not gone after three days of treatment.  GET PROMPT MEDICAL ATTENTION if any of the following occur:    Fever over 101 F (38.3 C)    No improvement by the third day of treatment    Increasing back or " abdominal pain    Repeated vomiting; unable to keep medicine down    Weakness, dizziness or fainting    Vaginal discharge    Pain, redness or swelling in the labia (outer vaginal area)    3425-8310 [employer], [address] [place] [address], [place] [address]. All rights reserved. This information is not intended as a substitute for professional medical care. Always follow your healthcare professional's instructions.         Review of your medicines      START taking        Dose / Directions Last dose taken    nitrofurantoin (macrocrystal-monohydrate) 100 MG capsule   Commonly known as:  MACROBID   Dose:  100 mg   Quantity:  5 capsule        Take 1 capsule (100 mg) by mouth 2 times daily   Refills:  0          Our records show that you are taking the medicines listed below. If these are incorrect, please call your family doctor or clinic.        Dose / Directions Last dose taken    fluticasone 50 MCG/ACT spray   Commonly known as:  FLONASE   Dose:  2 spray   Quantity:  16 g        Spray 2 sprays into both nostrils daily   Refills:  3        omeprazole 20 MG CR capsule   Commonly known as:  priLOSEC   Quantity:  90 capsule        TAKE 1 CAPSULE(20 MG) BY MOUTH DAILY   Refills:  1        trimethoprim-polymyxin b ophthalmic solution   Commonly known as:  POLYTRIM   Dose:  2 drop   Quantity:  1 Bottle        Place 2 drops Into the left eye 4 times daily   Refills:  0        ZANTAC PO        Take by mouth daily   Refills:  0                Prescriptions were sent or printed at these locations (1 Prescription)                   [facility] [address] [place] [address] - [address] AT [facility] of Pending sale to [facility] [address]   [address], [facility] [address]    Telephone:  [phone]   Fax:  [phone]   Hours:                  E-Prescribed (1 of 1)         nitrofurantoin, macrocrystal-monohydrate, (MACROBID) 100 MG capsule                Procedures and tests performed during your visit     CBC with platelets differential     Comprehensive metabolic panel    UA reflex to Microscopic and Culture      Orders Needing Specimen Collection     None      Pending Results     No orders found from 4/27/2017 to 4/30/2017.            Pending Culture Results     No orders found from 4/27/2017 to 4/30/2017.            Thank you for choosing [facility]       Thank you for choosing [facility] for your care. Our goal is always to provide you with excellent care. Hearing back from our patients is one way we can continue to improve our services. Please take a few minutes to complete the written survey that you may receive in the mail after you visit with us. Thank you!        Care EveryWhere ID     This is your Care EveryWhere ID. This could be used by other organizations to access your [facility] medical records  [id number]        After Visit Summary       This is your record. Keep this with you and show to your community pharmacist(s) and doctor(s) at your next visit.

## 2017-07-19 ENCOUNTER — TELEPHONE (OUTPATIENT)
Dept: FAMILY MEDICINE | Facility: OTHER | Age: 82
End: 2017-07-19

## 2017-07-19 NOTE — TELEPHONE ENCOUNTER
4:26 PM    Reason for Call: OVERBOOK    Patient is having the following symptoms: Weakness/dizziness/stomach pain for 1 weeks.    The patient is requesting an appointment for ASAP with Dr Sanchez.    Was an appointment offered for this call? Yes    Preferred method for responding to this message: Telephone Call   [phone]    If we cannot reach you directly, may we leave a detailed response at the number you provided? Yes    Can this message wait until your PCP/provider returns, if unavailable today? Not applicable    Jojo Alexandre

## 2017-07-21 ENCOUNTER — HISTORY (OUTPATIENT)
Dept: EMERGENCY MEDICINE | Facility: OTHER | Age: 82
End: 2017-07-21

## 2017-07-21 ENCOUNTER — COMMUNICATION - GICH (OUTPATIENT)
Dept: FAMILY MEDICINE | Facility: OTHER | Age: 82
End: 2017-07-21

## 2017-07-31 ENCOUNTER — OFFICE VISIT (OUTPATIENT)
Dept: FAMILY MEDICINE | Facility: OTHER | Age: 82
End: 2017-07-31
Attending: PHYSICIAN ASSISTANT
Payer: COMMERCIAL

## 2017-07-31 VITALS
HEART RATE: 68 BPM | WEIGHT: 129 LBS | TEMPERATURE: 98.8 F | OXYGEN SATURATION: 99 % | RESPIRATION RATE: 16 BRPM | HEIGHT: 62 IN | BODY MASS INDEX: 23.74 KG/M2 | DIASTOLIC BLOOD PRESSURE: 82 MMHG | SYSTOLIC BLOOD PRESSURE: 132 MMHG

## 2017-07-31 DIAGNOSIS — Z01.419 WELL WOMAN EXAM: Primary | ICD-10-CM

## 2017-07-31 PROCEDURE — 99397 PER PM REEVAL EST PAT 65+ YR: CPT | Performed by: PHYSICIAN ASSISTANT

## 2017-07-31 ASSESSMENT — PAIN SCALES - GENERAL: PAINLEVEL: NO PAIN (0)

## 2017-07-31 NOTE — NURSING NOTE
"Chief Complaint   Patient presents with     NURSING HOME REQUEST     Nursing Home Physical for [facility] admit later this week       Initial /82 (BP Location: Right arm, Patient Position: Chair, Cuff Size: Adult Regular)  Pulse 68  Temp 98.8  F (37.1  C) (Tympanic)  Resp 16  Ht 5' 2\" (1.575 m)  Wt 129 lb (58.5 kg)  SpO2 99%  BMI 23.59 kg/m2 Estimated body mass index is 23.59 kg/(m^2) as calculated from the following:    Height as of this encounter: 5' 2\" (1.575 m).    Weight as of this encounter: 129 lb (58.5 kg).  Medication Reconciliation: complete  Latia Lopez LPN  "

## 2017-07-31 NOTE — MR AVS SNAPSHOT
"              After Visit Summary   2017    Tia Garrett    MRN: [id number]           Patient Information     Date Of Birth          1927        Visit Information        Provider Department      2017 1:30 PM Alisia Moya PA [facility]        Today's Diagnoses     Well woman exam    -  1       Follow-ups after your visit        Who to contact     If you have questions or need follow up information about today's clinic visit or your schedule please contact [facility] directly at [phone].  Normal or non-critical lab and imaging results will be communicated to you by [email or web address], letter or phone within 4 business days after the clinic has received the results. If you do not hear from us within 7 days, please contact the clinic through [employer] or phone. If you have a critical or abnormal lab result, we will notify you by phone as soon as possible.  Submit refill requests through [employer] or call your pharmacy and they will forward the refill request to us. Please allow 3 business days for your refill to be completed.          Additional Information About Your Visit        MyChart Information     [employer]Sphere lets you send messages to your doctor, view your test results, renew your prescriptions, schedule appointments and more. To sign up, go to [email or web address] . Click on \"Log in\" on the left side of the screen, which will take you to the Welcome page. Then click on \"Sign up Now\" on the right side of the page.     You will be asked to enter the access code listed below, as well as some personal information. Please follow the directions to create your username and password.     Your access code is: [email or web address]  Expires: 10/29/2017  2:29 PM     Your access code will  in 90 days. If you need help or a new code, please call your [facility] or [phone].        Care EveryWhere ID     This is your Care EveryWhere ID. This could be used by other " "organizations to access your [facility] medical records  [id number]        Your Vitals Were     Pulse Temperature Respirations Height Pulse Oximetry BMI (Body Mass Index)    68 98.8  F (37.1  C) (Tympanic) 16 5' 2\" (1.575 m) 99% 23.59 kg/m2       Blood Pressure from Last 3 Encounters:   07/31/17 132/82   04/29/17 (!) 180/117   02/11/17 121/79    Weight from Last 3 Encounters:   07/31/17 129 lb (58.5 kg)   02/02/17 127 lb 3.2 oz (57.7 kg)   08/24/16 136 lb (61.7 kg)              Today, you had the following     No orders found for display       Primary Care Provider Office Phone # Fax #    Yousuf Sanchez [phone]       [facility] [address] [facility] [address]  [facility] [address]        Equal Access to Services     BA LAWRENCE : Hadii aad ku hadasho Soomaali, waaxda luqadaha, qaybta kaalmada adeegyada, waxay idiin hayaan ademark conteh ladebn . So [facility] [phone].    ATENCIÓN: Si habla español, tiene a maurer disposición servicios gratuitos de asistencia lingüística. Jeramie al [phone].    We comply with applicable federal civil rights laws and [place] laws. We do not discriminate on the basis of race, color, national origin, age, disability sex, sexual orientation or gender identity.            Thank you!     Thank you for choosing [facility]  for your care. Our goal is always to provide you with excellent care. Hearing back from our patients is one way we can continue to improve our services. Please take a few minutes to complete the written survey that you may receive in the mail after your visit with us. Thank you!             Your Updated Medication List - Protect others around you: Learn how to safely use, store and throw away your medicines at www.disposemymeds.org.          This list is accurate as of: 7/31/17  2:29 PM.  Always use your most recent med list.                   Brand Name Dispense Instructions for use Diagnosis    ZANTAC PO      Take by mouth daily as " needed

## 2017-07-31 NOTE — PROGRESS NOTES
Subjective:  Tia Garrett is a 89 year old female who presents for annual physical exam. She will moving to [facility]. She has been healthy but increasing concerns of family that she is having increased memory loss.    Past Medical History:   Diagnosis Date     Hiatal hernia        Past Surgical History:   Procedure Laterality Date     APPENDECTOMY OPEN      at age 16     HYSTERECTOMY TOTAL ABDOMINAL, BILATERAL SALPINGO-OOPHORECTOMY, COMBINED       ORTHOPEDIC SURGERY  07/02/2015    right hip surgery       Family History   Problem Relation Age of Onset     CANCER Father      lung cancer     Heart Failure Mother        Social History   Substance Use Topics     Smoking status: Never Smoker     Smokeless tobacco: Never Used     Alcohol use No       Current Outpatient Prescriptions   Medication     RaNITidine HCl (ZANTAC PO)     No current facility-administered medications for this visit.        No Known Allergies    Review of Systems:  Gen: negative for fever, chills, change in weight  Derm: negative for worrisome rashes, moles or lesions  Eyes: negative for vision changes or irritation  ENT: negative for ear, mouth and throat problems  Resp: negative for significant cough or SOB  Breast: negative for masses, tenderness or nipple discharge  CV: negative for chest pain, palpitations or peripheral edema  GI: negative for nausea, abdominal pain, heartburn, or change in bowel habits  : negative for dysuria, hematuria  Neuro: negative for weakness, dizziness or paresthesias  Endo: negative for temperature intolerance, skin/hair changes  Heme: negative for bleeding problems  Psych: negative for changes in mood or affect    Objective:  B/P: 132/82, T: 98.8, P: 68, R: 16  129 lbs 0 oz Body mass index is 23.59 kg/(m^2).           Physical Exam:  Constitutional: healthy, alert and no distress  Head: Normocephalic. No masses, lesions, tenderness or abnormalities  ENT: ENT exam normal, no neck nodes or sinus  tenderness  Breasts: symmetric, no dominant or suspicious mass, no skin or nipple changes, no axillary adenopathy palpable bilaterally  CV: RRR. No murmur. No pretibial edema  Pulm: Lungs clear to auscultation. No rhonchi, rales or wheeze  GI: Abdomen soft, non-tender. BS normal. No masses, organomegaly. No rebound or guarding  Musculoskeletal: extremities normal- no gross deformities noted, gait normal and normal muscle tone  Skin: no suspicious lesions or rashes  Neuroc: Gait normal. Reflexes normal and symmetric. Sensation grossly WNL.  Psych: mentation appears normal and affect normal/bright  Heme: normal ant/post cervical, axillary, supraclavicular and inguinal nodes        Assessment and Plan:    (Z01.419) Well woman exam  (primary encounter diagnosis)  Comment: Normal exam today. Nursing home will fax paperwork for Dr. Sanchez co-signature.        Alisia Moya PA-C

## 2017-08-01 ASSESSMENT — PATIENT HEALTH QUESTIONNAIRE - PHQ9: SUM OF ALL RESPONSES TO PHQ QUESTIONS 1-9: 2

## 2017-09-12 ENCOUNTER — HISTORY (OUTPATIENT)
Dept: FAMILY MEDICINE | Facility: OTHER | Age: 82
End: 2017-09-12

## 2017-09-12 ENCOUNTER — OFFICE VISIT - GICH (OUTPATIENT)
Dept: FAMILY MEDICINE | Facility: OTHER | Age: 82
End: 2017-09-12

## 2017-09-12 DIAGNOSIS — Z97.4 PRESENCE OF EXTERNAL HEARING AID: ICD-10-CM

## 2017-09-12 DIAGNOSIS — Z71.89 OTHER SPECIFIED COUNSELING: Chronic | ICD-10-CM

## 2017-09-12 DIAGNOSIS — Z66 DO NOT RESUSCITATE: ICD-10-CM

## 2017-09-12 DIAGNOSIS — F02.80 DEMENTIA IN OTHER DISEASES CLASSIFIED ELSEWHERE WITHOUT BEHAVIORAL DISTURBANCE: ICD-10-CM

## 2017-09-12 DIAGNOSIS — G30.9 ALZHEIMER'S DISEASE (H): ICD-10-CM

## 2017-09-12 DIAGNOSIS — F02.80 ALZHEIMER'S DISEASE (H): ICD-10-CM

## 2017-10-05 ENCOUNTER — AMBULATORY - GICH (OUTPATIENT)
Dept: FAMILY MEDICINE | Facility: OTHER | Age: 82
End: 2017-10-05

## 2017-10-12 ENCOUNTER — AMBULATORY - GICH (OUTPATIENT)
Dept: GERIATRICS | Facility: OTHER | Age: 82
End: 2017-10-12

## 2017-10-12 DIAGNOSIS — F02.80 ALZHEIMER'S DISEASE (H): ICD-10-CM

## 2017-10-12 DIAGNOSIS — Z97.4 PRESENCE OF EXTERNAL HEARING AID: ICD-10-CM

## 2017-10-12 DIAGNOSIS — G30.9 ALZHEIMER'S DISEASE (H): ICD-10-CM

## 2017-10-12 DIAGNOSIS — K21.9 GASTRO-ESOPHAGEAL REFLUX DISEASE WITHOUT ESOPHAGITIS: ICD-10-CM

## 2017-10-12 DIAGNOSIS — F02.80 DEMENTIA IN OTHER DISEASES CLASSIFIED ELSEWHERE WITHOUT BEHAVIORAL DISTURBANCE: ICD-10-CM

## 2017-11-20 ENCOUNTER — AMBULATORY - GICH (OUTPATIENT)
Dept: GERIATRICS | Facility: OTHER | Age: 82
End: 2017-11-20

## 2017-11-20 DIAGNOSIS — F02.80 ALZHEIMER'S DISEASE (H): ICD-10-CM

## 2017-11-20 DIAGNOSIS — Z66 DO NOT RESUSCITATE: ICD-10-CM

## 2017-11-20 DIAGNOSIS — F02.80 DEMENTIA IN OTHER DISEASES CLASSIFIED ELSEWHERE WITHOUT BEHAVIORAL DISTURBANCE: ICD-10-CM

## 2017-11-20 DIAGNOSIS — I10 ESSENTIAL (PRIMARY) HYPERTENSION: ICD-10-CM

## 2017-11-20 DIAGNOSIS — G30.9 ALZHEIMER'S DISEASE (H): ICD-10-CM

## 2017-12-09 ENCOUNTER — HOSPITAL - GICH (OUTPATIENT)
Dept: LAB | Facility: OTHER | Age: 82
End: 2017-12-09

## 2017-12-09 DIAGNOSIS — R31.9 HEMATURIA: ICD-10-CM

## 2017-12-09 LAB
BACTERIA URINE: ABNORMAL BACTERIA/HPF
BILIRUB UR QL: NEGATIVE
CLARITY, URINE: ABNORMAL CLARITY
COLOR UR: ABNORMAL COLOR
EPITHELIAL CELLS: ABNORMAL EPI/HPF
GLUCOSE URINE: NEGATIVE MG/DL
KETONES UR QL: NEGATIVE MG/DL
LEUKOCYTE ESTERASE URINE: ABNORMAL
NITRITE UR QL STRIP: NEGATIVE
OCCULT BLOOD,URINE - HISTORICAL: ABNORMAL
PH UR: 6.5 [PH]
PROTEIN QUALITATIVE,URINE - HISTORICAL: 100 MG/DL
RBC - HISTORICAL: >100 /HPF
SP GR UR STRIP: 1.01
UROBILINOGEN,QUALITATIVE - HISTORICAL: NORMAL EU/DL
WBC - HISTORICAL: ABNORMAL /HPF

## 2017-12-11 ENCOUNTER — AMBULATORY - GICH (OUTPATIENT)
Dept: FAMILY MEDICINE | Facility: OTHER | Age: 82
End: 2017-12-11

## 2017-12-11 DIAGNOSIS — R31.9 HEMATURIA: ICD-10-CM

## 2017-12-11 DIAGNOSIS — N39.0 URINARY TRACT INFECTION: ICD-10-CM

## 2017-12-21 ENCOUNTER — AMBULATORY - GICH (OUTPATIENT)
Dept: SCHEDULING | Facility: OTHER | Age: 82
End: 2017-12-21

## 2017-12-27 NOTE — PROGRESS NOTES
Patient Information     Patient Name MRN Sex Tia Harley [id number] Female 1927      Progress Notes by Kaylah Lorenz NP at 10/12/2017  3:00 AM     Author:  Kaylah Lorenz NP Service:  (none) Author Type:  PHYS- Nurse Practitioner     Filed:  10/15/2017  4:21 PM Encounter Date:  10/12/2017 Status:  Signed     :  Kaylah Lorenz NP (PHYS- Nurse Practitioner)            .

## 2017-12-27 NOTE — PROGRESS NOTES
"Patient Information     Patient Name MRN Sex Tia Harley [id number] Female 1927      Progress Notes by Marisel Vela MD at 2017  4:20 AM     Author:  Marisel Vela MD Service:  (none) Author Type:  Physician     Filed:  2017 11:23 AM Encounter Date:  2017 Status:  Signed     :  Marisel Vela MD (Physician)            90 recert for [facility].    Subjective:  Tia Garrett is a 89 y.o. female seen for 90 day visit after being recently admitted to the facility from the home of her son and daughter-in-law. She has adjusted well to the new routine. She tends to awaken early and then go back to bed and when I see her is getting ready to have breakfast. She expresses today that she feels \"punk\" but is unable to be more specific about feeling ill or having specific symptoms. There has been an outbreak of gastrointestinal illness at the facility but she has not had it yet. She does have some dementia and word find is definitely a difficulty for her right now.    Allergies: reviewed in EMR  Medications: reviewed in EMR  Patient Active Problem List     Diagnosis  Code     HTN (hypertension) I10     Hiatal hernia K44.9     GERD (gastroesophageal reflux disease) K21.9     Hematuria R31.9     Dementia in Alzheimer's disease G30.9, F02.80     Wears hearing aid Z97.4     Nursing home resident Z59.3     DNR (do not resuscitate) Z66         Social Hx:  Social History     Substance Use Topics       Smoking status: Never Smoker     Smokeless tobacco: Never Used     Alcohol use No     Social History Narrative    Moved to [facility] 8/3/2017. Had lived with her son, Dada,  and his wife in [place] for 15 years.    Daughter in law(Asmita Garrett) had worked at [facility] [employer] until 2016.     .     Has 4 children, 1 daughter and 3 sons. 1 son in [place], 1 in [facility] and 1 in [place], daughter in [place].  12 grand children and many greats.     "         Objective:  Vitals are reviewed from EMR printout at nursing facility. No concerns identified. Intermittent elevation of blood pressure but most are controlled.  Cardiac and lung exam are stable.    Assessment:    ICD-10-CM    1. Nursing home resident Z59.3    2. Dementia in Alzheimer's disease G30.9      F02.80    3. HTN (hypertension) I10    4. DNR (do not resuscitate) Z66        Plan:  No change in medications or need for changes identified.  Encourage involvement in activities. She does go outside the facility with family at times and they are very supportive.  Marisel Vela MD  11:22 AM 11/20/2017   Portions of this dictation were created using the Dragon Nuance voice recognition system. Proofreading was completed but there may be errors in text.

## 2017-12-28 NOTE — PROGRESS NOTES
Patient Information     Patient Name MRN Sex Tia Harley [id number] Female 1927      Progress Notes by Marisel Vela MD at 2017 11:00 AM     Author:  Marisel Vela MD Service:  (none) Author Type:  Physician     Filed:  2017 11:55 AM Encounter Date:  2017 Status:  Signed     :  Marisel Vela MD (Physician)            30 DAY SKILLED CARE RECERT:    SUBJECTIVE:    Tia Garrett is a 89 y.o. female who presents for 30 day re-certification for [facility] after being admitted on 8/3/2017 from home. She had been living with her son Dada and daughter-in-law Asmita at their home for the last 15 years but her dementia has been progressive and the family felt it was time that she be placed in a skilled care facility. Past history reviewed and she had a wellness visit in July and recently was in the ED with some chest pain and negative evaluation with labs updated there. She does have a history of borderline hypertension with intermittent elevation of blood pressures noted at the nursing home but is well controlled here today and with most recent levels done there. She is not on medication at this time.    HPI    No Known Allergies,   Current Outpatient Prescriptions:      acetaminophen (TYLENOL) 325 mg tablet, Take 2 tablets by mouth once daily if needed. Max acetaminophen dose: 4000mg in 24 hrs., Disp: , Rfl: 0     ranitidine (ZANTAC) 150 mg tablet, Take 1 tablet by mouth 2 times daily if needed. As needed for GERD, Disp: , Rfl: 0  Medications have been reviewed by me and are current to the best of my knowledge and ability. and   Patient Active Problem List       Diagnosis  Date Noted     Dementia in Alzheimer's disease  2017     Wears hearing aid  2017     Nursing home resident  2017     [facility] 2017        DNR (do not resuscitate)  2017     HTN (hypertension)  2016     Hiatal hernia  2016     GERD (gastroesophageal  "reflux disease)  11/28/2016     Hematuria  11/28/2016     Negative urology evaluation 2016.         Social History     Social History        Marital status:       Spouse name: N/A     Number of children:  N/A     Years of education:  N/A     Occupational History      Not on file.     Social History Main Topics       Smoking status: Never Smoker     Smokeless tobacco: Never Used     Alcohol use No     Drug use: No     Sexual activity: Not on file     Other Topics  Concern     Not on file      Social History Narrative     Moved to [facility] 8/3/2017. Had lived with her son, Dada,  and his wife in [place] for 15 years.    Daughter in law(Asmita Garrett) had worked at [employer] until 2016.     1991.     Has 4 children, 1 daughter and 3 sons. 1 son in [place] and all others in [place]. 12 grand children and many greats.            REVIEW OF SYSTEMS:  Review of Systems   Unable to perform ROS: Dementia       OBJECTIVE:  /82  Pulse 84  Ht 1.57 m (5' 1.81\")  Wt 59.3 kg (130 lb 12.8 oz)  BMI 24.07 kg/m2    EXAM:   Physical Exam   Constitutional: She is well-developed, well-nourished, and in no distress. No distress.   Wears hearing aids but uncertain if working properly today. Very difficult to talk to today.    Cardiovascular: Normal rate and regular rhythm.    Pulmonary/Chest: Effort normal and breath sounds normal.   Neurological: She is alert. Gait normal.   Skin: Skin is warm and dry.   Psychiatric: Affect normal.   Nursing note and vitals reviewed.      ASSESSMENT/PLAN:    ICD-10-CM    1. Encounter to establish care with new doctor Z71.89    2. Dementia in Alzheimer's disease G30.9      F02.80    3. Wears hearing aid Z97.4    4. Nursing home resident Z59.3    5. DNR (do not resuscitate) Z66         Plan:    Continue to monitor BP at facility and treat if majority high.  DNR/DNI status.  Family will check if hearing aids just need new batteries.  Marisel Vela MD  11:52 AM 9/12/2017     I " spent approximately 25 minutes with the patient (exclusive of separately billed services/procedures), with greater than 50% spent in Counseling, Prognosis, Risks and benefits of management or follow-up, Importance of compliance with chosen management options, Instructions of management (treatment) and/or follow-up, Risk factor reduction and Patient education and Coordinating Care, Establishing and/or reviewing the patient's medical record.

## 2017-12-28 NOTE — TELEPHONE ENCOUNTER
"Patient Information     Patient Name MRN Tia Holguin [id number] Female 1927      Telephone Encounter by Jessica Campuzano RN at 2017  9:37 AM     Author:  Jessica Campuzano RN  Service:  (none) Author Type:  NURS- Registered Nurse     Filed:  2017 11:07 AM  Encounter Date:  2017 Status:  Addendum     :  Jessica Campuzano RN (NURS- Registered Nurse)        Related Notes: Original Note by Jessica Campuzano RN (NURS- Registered Nurse) filed at 2017  9:44 AM            Patient presented to [address] window complaining of right-sided chest pain. She states she woke this morning with a throbbing pain in her right breast and into her back that has been constant. She rates the pain 410. Does not describe the pain as heavy or crushing, She denies recent injury, fevers, sweating, vomiting, nausea, dizziness and difficulty breathing. Due to patient age, chest pain lasting greater than 5 minutes and unknown cause, patient was escorted to ED for further evaluation.    Reason for Disposition    [1] Chest pain lasts > 5 minutes AND [2] age > 50    Answer Assessment - Initial Assessment Questions  1. LOCATION: \"Where does it hurt?\"        Right breast and back  2. RADIATION: \"Does the pain go anywhere else?\" (e.g., into neck, jaw, arms, back)      no  3. ONSET: \"When did the chest pain begin?\" (Minutes, hours or days)       Early this morning  4. PATTERN \"Does the pain come and go, or has it been constant since it started?\"  \"Does it get worse with exertion?\"       constant  5. DURATION: \"How long does it last\" (e.g., seconds, minutes, hours)      Several hours  6. SEVERITY: \"How bad is the pain?\"  (e.g., Scale 1-10; mild, moderate, or severe)     - MILD (1-3): doesn't interfere with normal activities      - MODERATE (4-7): interferes with normal activities or awakens from sleep     - SEVERE (8-10): excruciating pain, unable to do any normal activities        4/10  7. " "CARDIAC RISK FACTORS: \"Do you have any history of heart problems or risk factors for heart disease?\" (e.g., prior heart attack, angina; high blood pressure, diabetes, being overweight, high cholesterol, smoking, or strong family history of heart disease)      no  8. PULMONARY RISK FACTORS: \"Do you have any history of lung disease?\"  (e.g., blood clots in lung, asthma, emphysema, birth control pills)      no  9. CAUSE: \"What do you think is causing the chest pain?\"      unknown  10. OTHER SYMPTOMS: \"Do you have any other symptoms?\" (e.g., dizziness, nausea, vomiting, sweating, fever, difficulty breathing, cough)        no  11. PREGNANCY: \"Is there any chance you are pregnant?\" \"When was your last menstrual period?\"        no    Protocols used: ADULT CHEST PAIN-A-AH            "

## 2017-12-29 NOTE — H&P
Patient Information     Patient Name MRN Sex Tia Harley [id number] Female 1927      H&P signed by Kaylah Lorenz NP at 10/15/2017  3:58 PM      Author:  Kaylah Lorenz NP Service:  (none) Author Type:  PHYS- Nurse Practitioner     Filed:  10/15/2017  3:58 PM Encounter Date:  10/12/2017 Status:  Signed     :  Kaylha Lorenz NP (PHYS- Nurse Practitioner)            DATE OF SERVICE:  10/12/2017    [facility] 60-DAY RECERTIFICATION AND UPDATE NOTE    PRIMARY:  Dr. Vela    MEDICATIONS:  Reviewed.  She is on very few medications, mostly the house standing orders.  She does have Tylenol daily for her arthritis pain and she does have p.r.n.'s.    PROBLEM LIST:  Reviewed.  See EHR.  She has no medication allergies.    POLST:  Reviewed.  She is a do not resuscitate, do not intubate status, limited interventions, treat reversible conditions only, do not intubate.  Oral liquids, IV, oral, and IM antibiotics if indicated.  In the event of an emergency her daughter-in-law, Asmita Garrett, is her medical power of .    HISTORY OF PRESENT ILLNESS:  I am here to do a 60-day recertification update note on Tia Garrett.  She was initially admitted in 2017.  She was admitted from home for progressive dementia.  Nurse manager reports that she does have a history of falls; however, no falls have been witnessed in the past 30 days.  She is on a mechanical soft diet.  Staff feel that she is alert, oriented, pleasantly confused, oriented to self.  She uses her call light appropriately.  She is independent with transfers and ambulation.  She does have a walker.  Staff do not have any skin integrity concerns at this time.  For the most part, appears to be overall continent at this time.  Does not use her p.r.n. Tylenol.    REVIEW OF SYSTEMS:  Very limited due to the degree of memory loss and dementia.  Does not appear to be in any pain or distress.  She did report that she noticed some  "cramping after she had her breakfast this morning.  I did listen to her abdomen.  She has lots of active bowel sounds.  Abdomen is soft.  She reports that she gets these things from time to time and\" her mother got the same thing and she just needs to lay down and rest, and it passes\".    OBJECTIVE:  She is alert, pleasantly confused.  I did review the stool log.  It appears that she is having bowel movements most days.  There were 2 days in the past week that she did not have a bowel movement.  Staff reports she is having medium bowel movement.  They deny any constipation or hard stools.  I have asked them to monitor at this time.  Skin is warm and dry.  Chest is clear.  Heart rate is regular.  Blood pressures overall are running in the 120s-130s, a couple of outliers, 170 and 180 in the past couple of months, but consistently she has been running in the 120s-130s.  She does have a past history of hypertension, but has not been on any medication.  She has been afebrile.  O2 sats are 97% and above on room air.  Respirations are easy.  Weight is stable 128-131 pounds.  Appears to be acclimating well to the long-term care unit.    ASSESSMENT:  1.  History of hypertension.  Staff will continue to monitor.    2.  Hearing impaired.  She does wear hearing aids.  3.  History of reflux.  Does not appear to be an issue at this time.  4.  Dementia.    PLAN:  Total plan of care reviewed.  Medication and treatments reviewed and renewed.  Continue to do standing orders.  Monitor stools      EDUARDA ANSARI/bertha  D:10/12/2017 11:30:09  T:10/14/2017 16:21:55  KRISTI: [id number]  TJID: [id number]    cc:          "

## 2017-12-29 NOTE — PATIENT INSTRUCTIONS
Patient Information     Patient Name MRN Tia Holguin [id number] Female 1927      Patient Instructions by Marisel Vela MD at 2017 11:31 AM     Author:  Marisel Vela MD Service:  (none) Author Type:  Physician     Filed:  2017 11:31 AM Encounter Date:  2017 Status:  Signed     :  Marisel Vela MD (Physician)               Index   Dementia: Caring for Someone with Dementia   ________________________________________________________________________  KEY POINTS    Dementia is a gradual loss of the ability to think, remember, reason, and plan that commonly gets worse over time.    Caring for a person with dementia can affect family life, careers, finances, and your mental and physical health. As a caregiver, you need help and support too. Learn about community resources, support groups, and respite care programs that can help you and the person you care for.  ________________________________________________________________________  What is dementia?  Dementia is a gradual loss of the ability to think, remember, reason, and plan.   Dementia is a group of symptoms. Many things can cause dementia, the most common is Alzheimer s disease. People with advanced or severe dementia will not be able to care for themselves. Their behavior and personality may also change.  Dementia is not a normal part of aging. As healthy people get older, they may more often forget names, phone numbers, or where objects are. However, healthy older people usually remember or figure out how to look up a forgotten name or address. People with dementia forget things far more often. They also have much more trouble with problem-solving and thinking things through.  Dementia commonly gets worse over time and causes problems such as:    Poor judgment and not being able to understand the results of one s actions    Trouble following instructions or staying with a task, paying bills, fixing meals,  shopping, or taking medicines    Loss of interest in food    Less concern about looking neat and being clean    Wandering away from home or getting lost    Being unable to control bowel movements or urination    Being unsteady while walking, and falling    Forgetting how to eat or having trouble chewing and swallowing    Having a hard time speaking and thinking of the right words and sometimes becoming unable to speak    Not recognizing friends and family members  In time, someone with dementia may become totally dependent on others for care.  What should I do as a caregiver?  Caring for someone with dementia calls for extra patience and understanding. Here are some things that may help:    Do what you can to simplify the person s life. People with dementia do better with a regular routine. Keep routines and the surroundings the same if possible. Try to understand their past experiences and habits. Make current routines as much like the past as possible.    Make the home safer, such as by removing throw rugs and making sure there is good lighting to decrease the risk of falling.    Let the person do what he or she is able to do. Include them in decision making when possible. Speak clearly and simply and slowly. Break instructions down into small steps. You may need to repeat or rephrase your message to help the person understand.    Reduce distractions. For example, turn off the radio or TV when you need to talk to the person, or when they are eating.    Don t plan long trips. Short day trips to familiar places can be fun. Long-distance travel may be tiring and disorienting.    Try to limit the number of new people that are around at any one time. New people can increase stress for someone with dementia.    Avoid the things that may cause unwanted behavior. For example, crowds and noise may increase anxiety. If you must take the person into a busy or crowded place, someone should be with them all the  "time.    Multiple choices can be hard for people with dementia to handle. Give a limited number of simple choices. For example,  Do you want to wear your red shirt or the blue shirt?\"    Listen to what the person with dementia is saying, but don't argue. Try to understand the feeling behind the person's words. For example, don't tell the person that his or her mother is dead when the person is looking for her. Instead say, \"Oh, you miss your mother. What would you say to her if she were here?\" Then gradually change the subject.    Let emotional moments happen, as long as the person with dementia is not being violent. Crying while feeling sad is OK. The person with dementia usually will stop crying after a short time. When the emotion passes, talk about something else or start a different activity.  How should I respond to problem behavior?   Remember that problem behavior is a symptom of the disease. Don't take it personally.  Change the way you respond, rather than trying to change the behavior. People with dementia do not know that their behavior is not OK. They will often mirror the emotions of the people around them. If you are calm, they will be calm. If you are upset or angry, they may get more upset. Do not try to argue or explain. It s better to say, \"Let's do this now, over here,\" rather than trying to tell them why they shouldn't do something. Speak in a slow, calm tone of voice. Avoid sudden movements with your hands or body.  Plan events for the best time of the day for the person with dementia. During stressful activities provide frequent breaks. Offer snacks or return to a nonstressful familiar activity.  Change the surroundings if you think it may prevent a problem behavior from happening. For example, if the person wanders, put several types of locks on doors or put the bolt up higher than expected. For a person who goes through drawers, provide a special drawer, dresser, or closet for items that he " or she can rummage through.  If the person seems to get worse over a few days, with more confusion and problem behaviors, they may be getting sick. Tell the healthcare provider what kinds of changes you notice. This helps the provider decide what testing to do.   Where can I get help as a caregiver?   Caring for a person with dementia can affect family life, careers, finances, and your mental and physical health. As a caregiver, you need help and support too.   Support groups can help by giving you a chance to meet others with similar experiences. Meetings provide information but are also a good way for you to get out of the house and be with other people. Ask friends or neighbors to stay with your loved one so you are free to attend a support group. Some groups may also offer care for the person with dementia during the meeting time.   Community resources are very important. To find these services, talk with your healthcare provider, [facility] department, or visiting nurses association. If your town has one, the [facility] may be a good place to get information.    Social workers find and organize help, including possible financial aid.    Home healthcare agencies provide the services of nurses, medical social workers, health aides and therapists.    Training programs can help family and friends who care for a person with dementia    Out-of-home services include adult day care centers, mental health services, transportation; and nursing facilities.  Respite care programs provide a break to families who care for people with dementia at home. Many [facility] offer respite programs or other elder care assistance. Services can vary from a few hours to a few weeks. Adult day care programs offer care during daytime hours. Brief stays in nursing homes can also be arranged through your healthcare provider.   You can get information about services in your area from your local Area Agency on Aging. You can also call the  national [employer]  at 1-220.961.2719.  Developed by [employer].  Adult Advisor 2016.3 published by [employer].  Last modified: 2016-05-19  Last reviewed: 2014-12-08  This content is reviewed periodically and is subject to change as new health information becomes available. The information is intended to inform and educate and is not a replacement for medical evaluation, advice, diagnosis or treatment by a healthcare professional.  References   Adult Advisor 2016.3 Index    Copyright   2016 [employer], a division of [employer] All rights reserved.

## 2018-01-05 ENCOUNTER — COMMUNICATION - GICH (OUTPATIENT)
Dept: FAMILY MEDICINE | Facility: OTHER | Age: 83
End: 2018-01-05

## 2018-01-05 DIAGNOSIS — R31.9 HEMATURIA: ICD-10-CM

## 2018-01-05 DIAGNOSIS — N39.0 URINARY TRACT INFECTION: ICD-10-CM

## 2018-01-10 ENCOUNTER — AMBULATORY - GICH (OUTPATIENT)
Dept: GERIATRICS | Facility: OTHER | Age: 83
End: 2018-01-10

## 2018-01-10 DIAGNOSIS — F02.80 DEMENTIA IN OTHER DISEASES CLASSIFIED ELSEWHERE WITHOUT BEHAVIORAL DISTURBANCE: ICD-10-CM

## 2018-01-10 DIAGNOSIS — I10 ESSENTIAL (PRIMARY) HYPERTENSION: ICD-10-CM

## 2018-01-10 DIAGNOSIS — G30.9 ALZHEIMER'S DISEASE (H): ICD-10-CM

## 2018-01-10 DIAGNOSIS — F02.80 ALZHEIMER'S DISEASE (H): ICD-10-CM

## 2018-01-10 DIAGNOSIS — Z97.4 PRESENCE OF EXTERNAL HEARING AID: ICD-10-CM

## 2018-01-10 DIAGNOSIS — K21.9 GASTRO-ESOPHAGEAL REFLUX DISEASE WITHOUT ESOPHAGITIS: ICD-10-CM

## 2018-01-27 VITALS
DIASTOLIC BLOOD PRESSURE: 82 MMHG | BODY MASS INDEX: 24.07 KG/M2 | HEIGHT: 62 IN | SYSTOLIC BLOOD PRESSURE: 118 MMHG | WEIGHT: 130.8 LBS | HEART RATE: 84 BPM

## 2018-02-02 PROBLEM — G30.9 DEMENTIA IN ALZHEIMER'S DISEASE (H): Status: ACTIVE | Noted: 2017-09-12

## 2018-02-02 PROBLEM — Z66 DNR (DO NOT RESUSCITATE): Status: ACTIVE | Noted: 2017-09-12

## 2018-02-02 PROBLEM — Z97.4 WEARS HEARING AID: Status: ACTIVE | Noted: 2017-09-12

## 2018-02-02 PROBLEM — F02.80 DEMENTIA IN ALZHEIMER'S DISEASE (H): Status: ACTIVE | Noted: 2017-09-12

## 2018-02-02 PROBLEM — Z78.9 NURSING HOME RESIDENT: Status: ACTIVE | Noted: 2017-09-12

## 2018-02-12 NOTE — TELEPHONE ENCOUNTER
Patient Information     Patient Name MRN Tia Holguin [id number] Female 1927      Telephone Encounter by Giovanni Trevino MD at 2018  2:32 PM     Author:  Giovanni Trevino MD Service:  (none) Author Type:  Physician     Filed:  2018  2:32 PM Encounter Date:  2018 Status:  Signed     :  Giovanni Trevino MD (Physician)            OK with this change.  Giovanni Trevino MD ....................  2018   2:32 PM

## 2018-02-12 NOTE — TELEPHONE ENCOUNTER
Patient Information     Patient Name MRN Tia Holguin [id number] Female 1927      Telephone Encounter by Crista Huang at 2018  2:45 PM     Author:  Crista Huang Service:  (none) Author Type:  (none)     Filed:  2018  2:48 PM Encounter Date:  2018 Status:  Signed     :  Crista Huang            Spoke with Sophia nurse at [facility] in regards to mutual patient. Informed her of Giovanni Trevino MD message below. Will fax new med list and phone note for verification.  Crista Huang LPN............................ 2018 2:46 PM

## 2018-02-12 NOTE — TELEPHONE ENCOUNTER
Patient Information     Patient Name MRN Sex Tia Harley [id number] Female 1927      Telephone Encounter by Bertha Márquez at 2018  2:08 PM     Author:  Bertha Márquez Service:  (none) Author Type:  (none)     Filed:  2018  2:13 PM Encounter Date:  2018 Status:  Signed     :  Bertha Márquez            TJR-Per nurse @ . Pt is experiencing more knee/joint pain. Family is requesting her Tylenol 650mg be changed to TID and continue her PRN. Thank You.  Bertha Márquez ....................  2018   2:12 PM

## 2018-02-13 NOTE — PROGRESS NOTES
Patient Information     Patient Name MRN Sex     Tia Garrett [id number] Female 1927      Progress Notes by Ivone Dewey NP at 1/10/2018  3:20 AM     Author:  Ivone Dewey NP Service:  (none) Author Type:  PHYS- Nurse Practitioner     Filed:  2018 12:46 PM Encounter Date:  1/10/2018 Status:  Signed     :  Ivone Dewey NP (PHYS- Nurse Practitioner)            [facility] Long Term Care Visit  LTC Progess Note- 60 Day Recertification    Patient Name: Tia Garrett  YOB: 1927 Age: 89 y.o.  Medical Record Number: [id number]    Advanced Directives: DNR/DNI limit interventions    Dates: 2017 admitted to [facility] Primary Provider: Dr. Vela    HPI: Patient is seen today for evaluation for the following medical issues: Alzheimers dementia, hypertension, Gerd, hearing aid use, and right knee pain. Pt had been at activities this morning and walks to and back on her own with walker. Safely mobile. There are signs up in the facility with her name on them to direct her to her room. She has moderate dementia without behaviors. Very pleasant. When I saw her she was sitting in her rocking chair in her room. Content and waiting for lunch. She reports some right knee pain but per nursing staff this is much better with scheduled tylenol. She talkative but pleasantly confused. She just got new hearing aids but keeps taking them out and misplacing them despite having wires attached to them so she can't take them out. Weight is stable. Eating and drinking ok. Nursing staff with no concerns.   BIMS 5/15    ALLERGIES:  No Known Allergies    Past Medical History:    has no past medical history on file.    Past Surgical History:   has a past surgical history that includes Appendectomy open (1943); Total abdominal hysterectomy w/ bilateral salpingoophorectomy; and hip fracture tx (Right, 2015).    Family History:  family history is not on  file.    Social History:   reports that she has never smoked. She has never used smokeless tobacco. She reports that she does not drink alcohol or use illicit drugs.    Immunizations:   Immunization History     Administered  Date(s) Administered     Pneumococcal conj 13-Valent (Prevnar 13) 05/31/2016     Tdap 05/31/2016       Current Medications:   Current Outpatient Prescriptions       Medication  Sig Dispense Refill     acetaminophen (TYLENOL) 325 mg tablet Take 2 tablets by mouth 3 times daily. AND 650MG PO Q4H PRN. Max acetaminophen dose: 4000mg in 24 hrs.  0     trimethoprim-sulfamethoxazole,  mg, (BACTRIM; SEPTRA) per tablet Take 1 tablet by mouth 2 times daily. 14 tablet 0     No current facility-administered medications for this visit.      Medications have been reviewed by me and are current to the best of my knowledge and ability.    Medication reconciliation completed between [facility] record and NH MAR.       Current Diet: Mechanical soft diet    Review Of Systems:    Pleasantly confused. Reports right knee discomfort. No complaints otherwise. Denies chest pain, shortness of breath.     Vital Signs: (obtained from nursing home record)  Temp 97.0  Pulse 96   Resp 16   /84   O2 Sats 98% room air  Weight 138 # stable    Objective:   Pleasant and alert without distress. Pleasantly confused. Talkative.   Sclera nonicteric, conjunctiva non-inflamed.  Skin color pink. Mucous membranes moist.   Lungs clear to auscultation throughout. No wheezes or rales noted.   Cardiovascular regular, S1, S2 auscultated. No murmur noted.  Abdomen soft and without masses, tenderness   Extremities without edema.    Gait is stable. Uses walker.   Full range of motion of upper and lower extremities with normal sensation          Assessment and Plan:  (G30.9,  F02.80) Dementia in Alzheimer's disease  (primary encounter diagnosis)  Comment: Chronic, progressive  Plan: Continue supportive and comfort care. Takes tylenol for  knee pain scheduled.     (I10) HTN (hypertension)  Comment: Stable  Plan: No medications    (K21.9) Gastroesophageal reflux disease without esophagitis  Comment: Stable  Plan: No medications    (Z97.4) Wears hearing aid  Comment: Chronic hearing loss  Plan: Nursing staff to help monitor her hearing aid usage and help prevent her from losing new aides.       A total of 35 minutes was spent with this patient, of which more than 50% was spent in counseling and/or coordination of care regarding review of Multidisciplinary notes, laboratory values, medications, vital signs, weight and orders are reviewed from nursing home records. I have reviewed the patient s medical history and updated the computerized patient record.   .     Ivone Dewey NP  1/11/2018 12:34 PM

## 2018-03-15 ENCOUNTER — NURSING HOME VISIT (OUTPATIENT)
Dept: GERIATRICS | Facility: OTHER | Age: 83
End: 2018-03-15
Attending: FAMILY MEDICINE
Payer: COMMERCIAL

## 2018-03-15 DIAGNOSIS — F03.90 DEMENTIA WITHOUT BEHAVIORAL DISTURBANCE, UNSPECIFIED DEMENTIA TYPE: ICD-10-CM

## 2018-03-15 DIAGNOSIS — K21.9 GASTROESOPHAGEAL REFLUX DISEASE WITHOUT ESOPHAGITIS: ICD-10-CM

## 2018-03-15 DIAGNOSIS — Z66 DNR (DO NOT RESUSCITATE): ICD-10-CM

## 2018-03-15 DIAGNOSIS — Z78.9 NURSING HOME RESIDENT: Primary | ICD-10-CM

## 2018-03-15 PROCEDURE — 99304 1ST NF CARE SF/LOW MDM 25: CPT | Performed by: FAMILY MEDICINE

## 2018-03-15 NOTE — PROGRESS NOTES
[facility]    Location-[facility]  DD MD review.    HPI:    Tia Garrett is a 89 year old  (11/24/1927), who is being seen today for a federally mandated E/M visit at [facility].  HPI information obtained from: facility staff.Today's concerns are: None reported and patient unable to express this.      ICD-10-CM    1. Nursing home resident Z59.3    2. DNR (do not resuscitate) Z66    3. Dementia without behavioral disturbance, unspecified dementia type F03.90    4. Gastroesophageal reflux disease without esophagitis K21.9      Social History     Social History     Marital status:      Spouse name: N/A     Number of children: N/A     Years of education: N/A     Occupational History     Not on file.     Social History Main Topics     Smoking status: Never Smoker     Smokeless tobacco: Never Used     Alcohol use No     Drug use: Not on file      Comment: Drug use: No     Sexual activity: Not on file     Other Topics Concern     Not on file     Social History Narrative    Moved to [facility] 8/3/2017. Had lived with her son, Dada,  and his wife in [place] for 15 years.  Daughter in law(Asmita Garrett) had worked at [facility] until 2016.   1991.   Has 4 children, 1 daughter and 3 sons. 1 son in [place], 1 in [facility] and 1 in [place], daughter in [place].  12 grand children and many greats.       Past medical history, past surgical history, social history, allergies and medication list available at [facility] are reviewed today.      Case Management:  Information reviewed:  Medications, vital signs, orders, and nursing notes.    ROS:  Review of Systems   Unable to perform ROS: Dementia       Exam:  Vitals: Reviewed from flowsheet at the facility.  Vitals are stable with no concerns  Weight-143 pounds  Physical Exam   Constitutional: No distress.   Despite calling that I was coming for rounds the patient was sound asleep in her bed in her room.   Nursing note  and vitals reviewed.      Lab/Diagnostic data:     CBC RESULTS:   Recent Labs   Lab Test  07/21/17   1028  04/29/17   1029  02/02/17   1032   WBC   --   6.7  5.5   RBC   --   4.49  4.04   HGB  12.6  13.6  12.2   HCT  36.5  40.7  37.0   MCV  92  91  92   MCH  31.6  30.3  30.2   MCHC  34.5  33.4  33.0   RDW  12.7  12.9  13.3   PLT  203  241  185       Last Basic Metabolic Panel:  Recent Labs   Lab Test  07/21/17   1042  04/29/17   1029   NA  140  139   POTASSIUM  3.5  3.5   CHLORIDE  106  102   TAMMY  9.1  8.7   CO2  24  27   BUN  16  20   CR  0.65*  0.80   GLC  98  109*       Liver Function Studies -   Recent Labs   Lab Test  07/21/17   1042  04/29/17   1029  02/02/17   1032   PROTTOTAL  6.6  7.9  7.2   ALBUMIN  3.9  4.3  3.7   BILITOTAL  0.5  0.6  0.5   ALKPHOS  68  86  78   AST   --   18  18   ALT   --   16  19         ASSESSMENT:    ICD-10-CM    1. Nursing home resident Z59.3    2. DNR (do not resuscitate) Z66    3. Dementia without behavioral disturbance, unspecified dementia type F03.90    4. Gastroesophageal reflux disease without esophagitis K21.9        PLAN:  No care plan changes made.  Chart reviewed and updated.  Follow-up in 60 days by nurse practitioner  Marisel Vela MD  9:43 AM 3/15/2018   Portions of this dictation were created using the Dragon Nuance voice recognition system. Proofreading was completed but there may be errors in text.

## 2018-03-15 NOTE — MR AVS SNAPSHOT
"              After Visit Summary   3/15/2018    Tia Garrett    MRN: [id number]           Patient Information     Date Of Birth          1927        Visit Information        Provider Department      3/15/2018 3:30 AM Marisel Vela MD [facility]        Today's Diagnoses     Nursing home resident    -  1    DNR (do not resuscitate)        Dementia without behavioral disturbance, unspecified dementia type        Gastroesophageal reflux disease without esophagitis           Follow-ups after your visit        Who to contact     If you have questions or need follow up information about today's clinic visit or your schedule please contact [facility] directly at [phone].  Normal or non-critical lab and imaging results will be communicated to you by [employer], letter or phone within 4 business days after the clinic has received the results. If you do not hear from us within 7 days, please contact the clinic through [employer] or phone. If you have a critical or abnormal lab result, we will notify you by phone as soon as possible.  Submit refill requests through "[employer]" or call your pharmacy and they will forward the refill request to us. Please allow 3 business days for your refill to be completed.          Additional Information About Your Visit        MyChart Information     "[employer]" lets you send messages to your doctor, view your test results, renew your prescriptions, schedule appointments and more. To sign up, go to [email or web address] . Click on \"Log in\" on the left side of the screen, which will take you to the Welcome page. Then click on \"Sign up Now\" on the right side of the page.     You will be asked to enter the access code listed below, as well as some personal information. Please follow the directions to create your username and password.     Your access code is: [email or web address]  Expires: 2018  9:43 AM     Your access code will  in 90 " days. If you need help or a new code, please call your [facility] clinic or [phone].        Care EveryWhere ID     This is your Care EveryWhere ID. This could be used by other organizations to access your [facility] medical records  [id number]         Blood Pressure from Last 3 Encounters:   09/12/17 118/82   07/31/17 132/82   04/29/17 (!) 180/117    Weight from Last 3 Encounters:   09/12/17 130 lb 12.8 oz (59.3 kg)   07/31/17 129 lb (58.5 kg)   02/02/17 127 lb 3.2 oz (57.7 kg)              Today, you had the following     No orders found for display       Primary Care Provider Office Phone # Fax #    Yousuf Sanchez [phone]       [address] [place] [address]        Equal Access to Services     [facility]: Hadii elsa barron hadasho Soverena, waaxda luqadaha, qaybta kaalmada adeegyada, joão parson haylesly nicholas . So [facility] [phone].    ATENCIÓN: Si habla español, tiene a maurer disposición servicios gratuitos de asistencia lingüística. Llame al [phone].    We comply with applicable federal civil rights laws and [place] laws. We do not discriminate on the basis of race, color, national origin, age, disability, sex, sexual orientation, or gender identity.            Thank you!     Thank you for choosing [facility] AND [facility]  for your care. Our goal is always to provide you with excellent care. Hearing back from our patients is one way we can continue to improve our services. Please take a few minutes to complete the written survey that you may receive in the mail after your visit with us. Thank you!             Your Updated Medication List - Protect others around you: Learn how to safely use, store and throw away your medicines at www.disposemymeds.org.          This list is accurate as of 3/15/18  9:43 AM.  Always use your most recent med list.                   Brand Name Dispense Instructions for use Diagnosis    acetaminophen 325 MG tablet    TYLENOL      Take 650 mg by mouth Take 2 tablets by mouth 3 times daily. AND 650mg PO Q4H PRN. Max acetaminophen dose: 4000mg in 24 hrs.        sulfamethoxazole-trimethoprim 400-80 MG per tablet    BACTRIM/SEPTRA     Take 1 tablet by mouth 2 times daily        ZANTAC PO      Take by mouth daily as needed

## 2018-05-09 ENCOUNTER — NURSING HOME VISIT (OUTPATIENT)
Dept: GERIATRICS | Facility: OTHER | Age: 83
End: 2018-05-09
Attending: NURSE PRACTITIONER
Payer: COMMERCIAL

## 2018-05-09 DIAGNOSIS — G89.29 CHRONIC PAIN OF BOTH KNEES: ICD-10-CM

## 2018-05-09 DIAGNOSIS — K21.9 GASTROESOPHAGEAL REFLUX DISEASE WITHOUT ESOPHAGITIS: ICD-10-CM

## 2018-05-09 DIAGNOSIS — M25.562 CHRONIC PAIN OF BOTH KNEES: ICD-10-CM

## 2018-05-09 DIAGNOSIS — F03.90 DEMENTIA WITHOUT BEHAVIORAL DISTURBANCE, UNSPECIFIED DEMENTIA TYPE: Primary | ICD-10-CM

## 2018-05-09 DIAGNOSIS — I10 ESSENTIAL HYPERTENSION: ICD-10-CM

## 2018-05-09 DIAGNOSIS — M25.561 CHRONIC PAIN OF BOTH KNEES: ICD-10-CM

## 2018-05-09 PROCEDURE — 99309 SBSQ NF CARE MODERATE MDM 30: CPT | Performed by: NURSE PRACTITIONER

## 2018-05-09 NOTE — MR AVS SNAPSHOT
"              After Visit Summary   2018    Tia Garrett    MRN: [id number]           Patient Information     Date Of Birth          1927        Visit Information        Provider Department      2018 3:45 AM Ivone Dewey APRN CNP [facility]        Today's Diagnoses     Dementia without behavioral disturbance, unspecified dementia type    -  1    Gastroesophageal reflux disease without esophagitis        Essential hypertension        Chronic pain of both knees           Follow-ups after your visit        Who to contact     If you have questions or need follow up information about today's clinic visit or your schedule please contact [facility] AND [facility] directly at [phone].  Normal or non-critical lab and imaging results will be communicated to you by [employer], letter or phone within 4 business days after the clinic has received the results. If you do not hear from us within 7 days, please contact the clinic through [employer] or phone. If you have a critical or abnormal lab result, we will notify you by phone as soon as possible.  Submit refill requests through [employer] or call your pharmacy and they will forward the refill request to us. Please allow 3 business days for your refill to be completed.          Additional Information About Your Visit        MyChart Information     [employer] lets you send messages to your doctor, view your test results, renew your prescriptions, schedule appointments and more. To sign up, go to [email or web address] . Click on \"Log in\" on the left side of the screen, which will take you to the Welcome page. Then click on \"Sign up Now\" on the right side of the page.     You will be asked to enter the access code listed below, as well as some personal information. Please follow the directions to create your username and password.     Your access code is: [email or web address]  Expires: 2018  9:43 AM     Your access code will  in 90 " days. If you need help or a new code, please call your [facility] clinic or [phone].        Care EveryWhere ID     This is your Care EveryWhere ID. This could be used by other organizations to access your [facility] medical records  [id number]         Blood Pressure from Last 3 Encounters:   09/12/17 118/82   07/31/17 132/82   04/29/17 (!) 180/117    Weight from Last 3 Encounters:   09/12/17 130 lb 12.8 oz (59.3 kg)   07/31/17 129 lb (58.5 kg)   02/02/17 127 lb 3.2 oz (57.7 kg)              Today, you had the following     No orders found for display       Primary Care Provider Office Phone # Fax #    Yousuf Sanchez [phone]       [address] [place] [address]        Equal Access to Services     [facility]: Hadii elsa barron hadasho Soomaali, waaxda luqadaha, qaybta kaalmada adeegyada, joão parson haylesly nicholas . So [facility] [phone].    ATENCIÓN: Si habla español, tiene a maurer disposición servicios gratuitos de asistencia lingüística. Llame al [phone].    We comply with applicable federal civil rights laws and [place] laws. We do not discriminate on the basis of race, color, national origin, age, disability, sex, sexual orientation, or gender identity.            Thank you!     Thank you for choosing [facility] [place] AND [facility]  for your care. Our goal is always to provide you with excellent care. Hearing back from our patients is one way we can continue to improve our services. Please take a few minutes to complete the written survey that you may receive in the mail after your visit with us. Thank you!             Your Updated Medication List - Protect others around you: Learn how to safely use, store and throw away your medicines at www.disposemymeds.org.          This list is accurate as of 5/9/18 11:59 PM.  Always use your most recent med list.                   Brand Name Dispense Instructions for use Diagnosis    Acetaminophen 325 MG Caps     100  capsule    Take 650 mg by mouth 4 times daily And 650 mg q 24 hours PRN pain.    Chronic pain of both knees

## 2018-05-10 NOTE — PROGRESS NOTES
[facility]  60 DAY RECERTIFICATION    Patient Name: Tia Garrett  YOB: 1927 Age: 89 year old  Medical RecordNumber: [id number]  Primary Provider: Dr. Vela    Advanced Directives: DNR    Date admitted to [facility]: 8/2017      HPI: Patient is seen today for 60 day evaluation for the following medical issues:  Dementia without behaviors, gerd, hypertension, and chronic pain bilateral knees  Pt ambulates with walker to activities, long hallways. Doing well but does have chronic knee pain bilaterally-mostly right side. No behaviors noted per nursing staff. Easily redirectable. Sleeping well. Sits in room and works on word [employer]. No acid reflux. Only takes tylenol for pain.     ALLERGIES:  No Known Allergies    Past Medical History:    has no past medical history on file.    Past Surgical History:   has a past surgical history that includes other surgical history; other surgical history; and other surgical history.    Family History:  family history is not on file.    Social History:   reports that she has never smoked. She has never used smokeless tobacco. She reports that she does not drink alcohol.    Immunizations:   Immunization History   Administered Date(s) Administered     Influenza (IIV3) PF 12/14/2012     Pneumo Conj 13-V (2010&after) 05/31/2016     TDAP Vaccine (Boostrix) 05/31/2016       Current Medications:   Current Outpatient Prescriptions   Medication     acetaminophen (TYLENOL) 325 MG tablet     RaNITidine HCl (ZANTAC PO)     sulfamethoxazole-trimethoprim (BACTRIM/SEPTRA) 400-80 MG per tablet     No current facility-administered medications for this visit.      Medication reconciliation completed between [facility] record and NH MAR.       Current Diet: Regular, mechanical soft    Review Of Systems:    Right knee pain but does walk with walker down long hallways.   Denies chest pain, shortness of breath. No bowel concerns. Urinating ok. Denies abd pain,  headaches. No vision changes, no teeth concerns. Wears glasses.   Sleeping ok.     Vital Signs: (obtained from nursing home record)  Temp 96.0  Pulse 71   Resp 18   /85   O2 Sats 96%   Weight 145.8 #     Objective:  Pleasant and alert without distress. Affect normal.   Sclera nonicteric, conjunctivanon-inflamed.  Skin color pink. Mucous membranes moist.   Lungs clear to auscultation throughout. No wheezes or rales noted.   Cardiovascular regular, S1, S2 auscultated. No murmur noted.  Abdomen soft and without masses, tenderness Extremities without edema. DPPT   Gait is stable. Uses walker.  Full range of motion of upper and lower extremities       Assessment and Plan:  (F03.90) Dementia without behavioral disturbance, unspecified dementia type  (primary encounter diagnosis)  Comment: Progressive  Plan: Supportive cares, comfort cares    (K21.9) Gastroesophageal reflux disease without esophagitis  Comment: Stable  Plan: No medications    (I10) Essential hypertension  Comment: Stable  Plan: No medications    (M25.561,  M25.562,  G89.29) Chronic pain of both knees  Comment: Chronic  Plan: Acetaminophen 325 MG CAPS--650 mg QID.                 Multidisciplinary notes, laboratory values, medications, vital signs, weight and orders are reviewed from nursing home records. I have reviewed the patient s medical history and updated the computerized patient record.     A total of 35 minutes was spent with this patient, of which more than 50% was spent in counseling and/or coordination of care.   Ivone Dewey  5/10/2018 10:59 AM

## 2018-06-26 ENCOUNTER — NURSING HOME VISIT (OUTPATIENT)
Dept: GERIATRICS | Facility: OTHER | Age: 83
End: 2018-06-26
Attending: NURSE PRACTITIONER
Payer: COMMERCIAL

## 2018-06-26 DIAGNOSIS — M25.561 ACUTE PAIN OF RIGHT KNEE: Primary | ICD-10-CM

## 2018-06-26 PROCEDURE — 99308 SBSQ NF CARE LOW MDM 20: CPT | Performed by: NURSE PRACTITIONER

## 2018-06-27 NOTE — PROGRESS NOTES
[facility]  Acute Care Visit    Patient Name: Tia Garrett   : 1927  MRN: [id number]    Place of Service: [facility]  DOS: 2018    CC: right knee pain acute on chronic    HPI:  Tia Garrett is a 89 year old female with PMH of multiple chronic medical concerns, who is seen today regarding right knee pain acute on chronic. Pt has been taking scheduled tylenol and this has typically controlled her pain but nursing staff has noted the past few days as she is ambulating with her walker in the hallway that she was teary-eyed and reporting her right knee was hurting. This had been happening for past couple days. No recent fall. No acute injury. No fevers. No signs of localized infection.  No hx of gout.      Multidisciplinary notes, laboratory values, medications, vital signs, weight and orders arereviewed from nursing home records. I have reviewed the patient s medical history and updated the computerized patient record.     PMH:  No past medical history on file.    Current Outpatient Prescriptions   Medication     Acetaminophen 325 MG CAPS     No current facility-administered medications for this visit.          Medication reconciliation complete between [facility] record and NH MAR.     Allergies:  No Known Allergies    Review of Systems:  +right knee pain with ambulation  Limited ROS due dementia. See HPI.     Vital Signs:  Temp 97.7   Pulse 70   Respirations 20  /72  Oxygen Sats 96%   Weight 147.8#    Physical Exam:  Pt able to ambulate but showing facial signs of pain.   Right knee slightly enlarged. No pain with palpation.   Increased pain with weight while walking.   No redness or sign of infection      Assessment/Plan:  (M25.561) Acute pain of right knee  (primary encounter diagnosis)  Comment: acute on chronic  Plan: continue tylenol, trial oxycodone 2.5 mg q 6 hours prn pain, biofreeze scheduled bid to right knee        A total of 16 minutes was spent with this patient,  of which more than 50% was spent in counseling and/or coordination of care.     SINGH Romero ....................  6/26/2018   7:53 PM

## 2018-06-30 RX ORDER — OXYCODONE HYDROCHLORIDE 5 MG/1
2.5 TABLET ORAL EVERY 6 HOURS PRN
Qty: 90 TABLET | Refills: 0 | COMMUNITY
Start: 2018-06-30 | End: 2018-08-01

## 2018-07-31 ENCOUNTER — NURSING HOME VISIT (OUTPATIENT)
Dept: GERIATRICS | Facility: OTHER | Age: 83
End: 2018-07-31
Attending: FAMILY MEDICINE
Payer: COMMERCIAL

## 2018-07-31 DIAGNOSIS — M17.11 PRIMARY OSTEOARTHRITIS OF RIGHT KNEE: ICD-10-CM

## 2018-07-31 DIAGNOSIS — Z78.9 NURSING HOME RESIDENT: Primary | ICD-10-CM

## 2018-07-31 DIAGNOSIS — F02.80 DEMENTIA IN ALZHEIMER'S DISEASE (H): ICD-10-CM

## 2018-07-31 DIAGNOSIS — G30.9 DEMENTIA IN ALZHEIMER'S DISEASE (H): ICD-10-CM

## 2018-07-31 DIAGNOSIS — Z66 DNR (DO NOT RESUSCITATE): ICD-10-CM

## 2018-07-31 PROCEDURE — 99308 SBSQ NF CARE LOW MDM 20: CPT | Performed by: FAMILY MEDICINE

## 2018-07-31 NOTE — MR AVS SNAPSHOT
After Visit Summary   7/31/2018    Tia Garrett    MRN: [id number]           Patient Information     Date Of Birth          11/24/1927        Visit Information        Provider Department      7/31/2018 3:00 AM Marisel Vela MD [facility]        Today's Diagnoses     Nursing home resident    -  1    Dementia in Alzheimer's disease        DNR (do not resuscitate)        Primary osteoarthritis of right knee           Follow-ups after your visit        Who to contact     If you have questions or need follow up information about today's clinic visit or your schedule please contact [facility] AND [facility] directly at [phone].  Normal or non-critical lab and imaging results will be communicated to you by MyChart, letter or phone within 4 business days after the clinic has received the results. If you do not hear from us within 7 days, please contact the clinic through MyChart or phone. If you have a critical or abnormal lab result, we will notify you by phone as soon as possible.  Submit refill requests through [facility] or call your pharmacy and they will forward the refill request to us. Please allow 3 business days for your refill to be completed.          Additional Information About Your Visit        Care EveryWhere ID     This is your Care EveryWhere ID. This could be used by other organizations to access your [facility] medical records  [id number]        Your Vitals Were     Pulse Temperature BMI (Body Mass Index)             70 96.7  F (35.9  C) 26.68 kg/m2          Blood Pressure from Last 3 Encounters:   08/01/18 (!) 185/104   09/12/17 118/82   07/31/17 132/82    Weight from Last 3 Encounters:   08/01/18 145 lb (65.8 kg)   09/12/17 130 lb 12.8 oz (59.3 kg)   07/31/17 129 lb (58.5 kg)              Today, you had the following     No orders found for display         Today's Medication Changes          These changes are accurate as of 7/31/18 11:59 PM.  If  you have any questions, ask your nurse or doctor.               Stop taking these medicines if you haven't already. Please contact your care team if you have questions.     oxyCODONE IR 5 MG tablet   Commonly known as:  ROXICODONE                    Primary Care Provider Office Phone # Fax #    Yousuf Sanchez [phone]       [address] [place] [address]        Equal Access to Services     [facility]: Hadii aad ku hadasho Soomaali, waaxda luqadaha, qaybta kaalmada adeegyada, waxay idiin hayaan adeeg khbensh la'aan . So [facility] [phone].    ATENCIÓN: Si habla espkathia, tiene a maurer disposición servicios gratuitos de asistencia lingüística. Llame al [phone].    We comply with applicable federal civil rights laws and [place] laws. We do not discriminate on the basis of race, color, national origin, age, disability, sex, sexual orientation, or gender identity.            Thank you!     Thank you for choosing [facility] AND [facility]  for your care. Our goal is always to provide you with excellent care. Hearing back from our patients is one way we can continue to improve our services. Please take a few minutes to complete the written survey that you may receive in the mail after your visit with us. Thank you!             Your Updated Medication List - Protect others around you: Learn how to safely use, store and throw away your medicines at www.disposemymeds.org.          This list is accurate as of 7/31/18 11:59 PM.  Always use your most recent med list.                   Brand Name Dispense Instructions for use Diagnosis    Acetaminophen 325 MG Caps     100 capsule    Take 650 mg by mouth 4 times daily And 650 mg q 24 hours PRN pain.    Chronic pain of both knees

## 2018-08-01 VITALS
WEIGHT: 145 LBS | HEART RATE: 70 BPM | BODY MASS INDEX: 26.68 KG/M2 | DIASTOLIC BLOOD PRESSURE: 104 MMHG | TEMPERATURE: 96.7 F | SYSTOLIC BLOOD PRESSURE: 185 MMHG

## 2018-08-01 PROBLEM — M17.11 PRIMARY OSTEOARTHRITIS OF RIGHT KNEE: Status: ACTIVE | Noted: 2018-08-01

## 2018-08-01 NOTE — PROGRESS NOTES
89 Day MD review at [facility]:  SUBJECTIVE:   Tia Garrett is a 89 year old female who is seen at [facility] for visit and review.   Has been having right knee pain now managed well with Tramadol.  She has progressive dementia and significant difficulties now with conversation and cognition.     HPI    Patient Active Problem List   Diagnosis     Essential hypertension     Hiatal hernia     GERD (gastroesophageal reflux disease)     ACP (advance care planning)     Dementia without behavioral disturbance, unspecified dementia type     Dementia in Alzheimer's disease     DNR (do not resuscitate)     Hematuria     Nursing home resident     Wears hearing aid     Primary osteoarthritis of right knee     Past Surgical History:   Procedure Laterality Date     OTHER SURGICAL HISTORY      1943,SUR38,APPENDECTOMY OPEN     OTHER SURGICAL HISTORY      SHX83,TOTAL ABDOMINAL HYSTERECTOMY W/ BILATERAL SALPINGOOPHORECTOMY     OTHER SURGICAL HISTORY      07/2015,UCYHW509,HIP FRACTURE TX,Right       Social History   Substance Use Topics     Smoking status: Never Smoker     Smokeless tobacco: Never Used     Alcohol use No     No family history on file.      Current Outpatient Prescriptions   Medication Sig Dispense Refill     Acetaminophen 325 MG CAPS Take 650 mg by mouth 4 times daily And 650 mg q 24 hours PRN pain. 100 capsule      No Known Allergies    Review of Systems     OBJECTIVE:     BP (!) 185/104 (Cuff Size: Adult Regular)  Pulse 70  Temp 96.7  F (35.9  C)  Wt 145 lb (65.8 kg)  BMI 26.68 kg/m2  Body mass index is 26.68 kg/(m^2).  Physical Exam   Constitutional: She appears well-developed and well-nourished.   Musculoskeletal:   Right knee joint much broader than left consistent with DJD   Nursing note and vitals reviewed.      Diagnostic Test Results:  none     ASSESSMENT/PLAN:         ICD-10-CM    1. Nursing home resident Z59.3    2. Dementia in Alzheimer's disease G30.9     F02.80    3. DNR (do not  resuscitate) Z66    4. Primary osteoarthritis of right knee M17.11      Plan:   She has been fit with a brace for right knee. Do not expect significant improvement as exam indicates advanced DJD and joint damaged.   Continue tramadol and seems to tolerate this medication for her pain.   Medications are not accurate in clinic chart and will have nursing staff update.  Marisel Vela MD  [facility]  Portions of this dictation were created using the Dragon Nuance voice recognition system. Proofreading was completed but there may be errors in text.

## 2018-08-02 ENCOUNTER — TELEPHONE (OUTPATIENT)
Dept: FAMILY MEDICINE | Facility: OTHER | Age: 83
End: 2018-08-02

## 2018-08-02 RX ORDER — TRAMADOL HYDROCHLORIDE 50 MG/1
12.5 TABLET ORAL EVERY 6 HOURS PRN
Qty: 10 TABLET | Refills: 0 | Status: CANCELLED | COMMUNITY
Start: 2018-08-02

## 2018-08-08 ENCOUNTER — NURSING HOME VISIT (OUTPATIENT)
Dept: GERIATRICS | Facility: OTHER | Age: 83
End: 2018-08-08
Attending: NURSE PRACTITIONER
Payer: COMMERCIAL

## 2018-08-08 DIAGNOSIS — G89.29 CHRONIC PAIN OF RIGHT KNEE: ICD-10-CM

## 2018-08-08 DIAGNOSIS — M25.561 CHRONIC PAIN OF RIGHT KNEE: ICD-10-CM

## 2018-08-08 DIAGNOSIS — L72.3 SEBACEOUS CYST: Primary | ICD-10-CM

## 2018-08-08 PROCEDURE — 99308 SBSQ NF CARE LOW MDM 20: CPT | Performed by: NURSE PRACTITIONER

## 2018-08-08 RX ORDER — AMOXICILLIN 500 MG/1
500 CAPSULE ORAL EVERY 8 HOURS
Refills: 0 | COMMUNITY
Start: 2018-08-08 | End: 2018-08-15

## 2018-08-08 RX ORDER — TRAMADOL HYDROCHLORIDE 50 MG/1
25 TABLET ORAL EVERY 6 HOURS PRN
Qty: 10 TABLET | Refills: 0 | Status: SHIPPED | OUTPATIENT
Start: 2018-08-08 | End: 2018-10-12

## 2018-08-08 NOTE — PROGRESS NOTES
"[facility]  Acute Care Visit    Patient Name: Tia Garrett   : 1927  MRN: [id number]    Place of Service: [facility]  DOS: 2018    CC: nodule/cyst by right eye    HPI:  Tia Garrett is a 89 year old female with PMH of multiple chronic medical concerns, who is seen today regarding nursing staff reporting pt has had a nodule/cyst by right eye, adjacent to nose. They have been putting warm packs on but does not seem to be improving. Pt unable to wear glasses at this time.   It is starting to look more reddened and inflamed and seems sore to patient if bumped but otherwise she denies pain. Pt does have significant dementia that is slowly progressing and she seems quite disturbed today that she is not understanding her purpose in life and does not know why she is her. She wants to go home.       Multidisciplinary notes, laboratory values, medications, vital signs, weight and orders arereviewed from nursing home records. I have reviewed the patient s medical history and updated the computerized patient record.     PMH:  No past medical history on file.    Medications:  Current Outpatient Prescriptions   Medication     Acetaminophen 325 MG CAPS     No current facility-administered medications for this visit.        Medication reconciliation complete between [facility] record and [facility].     Allergies:  No Known Allergies    Review of Systems:  Limited ROS due to dementia.   Reports right knee pain  Minimal pain to nodule by right eye to touch  Unable to wear glasses due to nodule  +very confused and disturbed today as she states she does not know her purpose in life and does not know why she is here.     Vital Signs:  Temp 96.3   Pulse 74   Respirations 20   /47   Oxygen Sats 96%   Weight 143.1#    Physical Exam:   Pleasant and alert with some distress of not remembering where she is and why she is living \"in this place.\" Affect pleasantly confused.  Sclera nonicteric, conjunctiva " non-inflamed.  Skin color pink. Just below right eye adjacent to nose hardened, inflamed nodule.   Lungs clear to auscultation throughout. No wheezes or rales noted.   Cardiovascular regular, S1, S2 auscultated. No murmur noted.      Assessment/Plan:  (L72.3) Sebaceous cyst  (primary encounter diagnosis)  Comment: infected  Plan: Apply warm moist compresses to area QID for 20 min until healed for comfort  Amoxicillin 500 mg q 8 hours x 7 days.         A total of 15 minutes was spent with this patient, of which more than 50% was spent in counseling and/or coordination of care.     SINGH Romero ....................  8/8/2018   5:55 PM

## 2018-08-08 NOTE — MR AVS SNAPSHOT
After Visit Summary   8/8/2018    Tia Garrett    MRN: [id number]           Patient Information     Date Of Birth          11/24/1927        Visit Information        Provider Department      8/8/2018 3:00 AM Ivone Dewey APRN CNP [facility]        Today's Diagnoses     Sebaceous cyst    -  1    Chronic pain of right knee           Follow-ups after your visit        Who to contact     If you have questions or need follow up information about today's clinic visit or your schedule please contact [facility] directly at [phone].  Normal or non-critical lab and imaging results will be communicated to you by MyChart, letter or phone within 4 business days after the clinic has received the results. If you do not hear from us within 7 days, please contact the clinic through MyChart or phone. If you have a critical or abnormal lab result, we will notify you by phone as soon as possible.  Submit refill requests through [employer] or call your pharmacy and they will forward the refill request to us. Please allow 3 business days for your refill to be completed.          Additional Information About Your Visit        Care EveryWhere ID     This is your Care EveryWhere ID. This could be used by other organizations to access your [facility] medical records  [id number]         Blood Pressure from Last 3 Encounters:   08/01/18 (!) 185/104   09/12/17 118/82   07/31/17 132/82    Weight from Last 3 Encounters:   08/01/18 145 lb (65.8 kg)   09/12/17 130 lb 12.8 oz (59.3 kg)   07/31/17 129 lb (58.5 kg)              Today, you had the following     No orders found for display         Today's Medication Changes          These changes are accurate as of 8/8/18  6:19 PM.  If you have any questions, ask your nurse or doctor.               Start taking these medicines.        Dose/Directions    traMADol 50 MG tablet   Commonly known as:  ULTRAM   Used for:  Chronic pain of right  knee        Dose:  25 mg   Take 0.5 tablets (25 mg) by mouth every 6 hours as needed for severe pain Pt to take 12.5 mg q 6 hours prn pain. Epic would not allow me to enter this small of a dose.   Quantity:  10 tablet   Refills:  0            Where to get your medicines      Some of these will need a paper prescription and others can be bought over the counter.  Ask your nurse if you have questions.     Bring a paper prescription for each of these medications     traMADol 50 MG tablet               Information about OPIOIDS     PRESCRIPTION OPIOIDS: WHAT YOU NEED TO KNOW   We gave you an opioid (narcotic) pain medicine. It is important to manage your pain, but opioids are not always the best choice. You should first try all the other options your care team gave you. Take this medicine for as short a time (and as few doses) as possible.    Some activities can increase your pain, such as bandage changes or therapy sessions. It may help to take your pain medicine 30 to 60 minutes before these activities. Reduce your stress by getting enough sleep, working on hobbies you enjoy and practicing relaxation or meditation. Talk to your care team about ways to manage your pain beyond prescription opioids.    These medicines have risks:    DO NOT drive when on new or higher doses of pain medicine. These medicines can affect your alertness and reaction times, and you could be arrested for driving under the influence (DUI). If you need to use opioids long-term, talk to your care team about driving.    DO NOT operate heavy machinery    DO NOT do any other dangerous activities while taking these medicines.    DO NOT drink any alcohol while taking these medicines.     If the opioid prescribed includes acetaminophen, DO NOT take with any other medicines that contain acetaminophen. Read all labels carefully. Look for the word  acetaminophen  or  Tylenol.  Ask your pharmacist if you have questions or are unsure.    You can get addicted  to pain medicines, especially if you have a history of addiction (chemical, alcohol or substance dependence). Talk to your care team about ways to reduce this risk.    All opioids tend to cause constipation. Drink plenty of water and eat foods that have a lot of fiber, such as fruits, vegetables, prune juice, apple juice and high-fiber cereal. Take a laxative (Miralax, milk of magnesia, Colace, Senna) if you don t move your bowels at least every other day. Other side effects include upset stomach, sleepiness, dizziness, throwing up, tolerance (needing more of the medicine to have the same effect), physical dependence and slowed breathing.    Store your pills in a secure place, locked if possible. We will not replace any lost or stolen medicine. If you don t finish your medicine, please throw away (dispose) as directed by your pharmacist. The [place] [employer]ution [employer] has more information about safe disposal: https://www.pca.[facility].mn.us/living-green/managing-unwanted-medications         Primary Care Provider Office Phone # Fax #    Yousuf Sanchez [phone]       [address] [place] [address]        Equal Access to Services     [facility] : Hadii elsa barron hadasho Soverena, waaxda luqadaha, qaybta kaalmada ademarkyada, joão nicholas . So [facility] [phone].    ATENCIÓN: Si habla español, tiene a maurer disposición servicios gratuitos de asistencia lingüística. Llame al [phone].    We comply with applicable federal civil rights laws and [place] laws. We do not discriminate on the basis of race, color, national origin, age, disability, sex, sexual orientation, or gender identity.            Thank you!     Thank you for choosing [facility] AND [facility]  for your care. Our goal is always to provide you with excellent care. Hearing back from our patients is one way we can continue to improve our services. Please take a few minutes to complete the  written survey that you may receive in the mail after your visit with us. Thank you!             Your Updated Medication List - Protect others around you: Learn how to safely use, store and throw away your medicines at www.disposemymeds.org.          This list is accurate as of 8/8/18  6:19 PM.  Always use your most recent med list.                   Brand Name Dispense Instructions for use Diagnosis    Acetaminophen 325 MG Caps     100 capsule    Take 650 mg by mouth 4 times daily And 650 mg q 24 hours PRN pain.    Chronic pain of both knees       amoxicillin 500 MG capsule    AMOXIL     Take 500 mg by mouth every 8 hours        traMADol 50 MG tablet    ULTRAM    10 tablet    Take 0.5 tablets (25 mg) by mouth every 6 hours as needed for severe pain Pt to take 12.5 mg q 6 hours prn pain. Epic would not allow me to enter this small of a dose.    Chronic pain of right knee

## 2018-09-10 RX ORDER — AMMONIUM LACTATE 12 G/100G
LOTION TOPICAL 2 TIMES DAILY
COMMUNITY
Start: 2018-08-02 | End: 2020-01-01

## 2018-09-25 ENCOUNTER — NURSING HOME VISIT (OUTPATIENT)
Dept: GERIATRICS | Facility: OTHER | Age: 83
End: 2018-09-25
Attending: NURSE PRACTITIONER
Payer: COMMERCIAL

## 2018-09-25 DIAGNOSIS — K21.9 GASTROESOPHAGEAL REFLUX DISEASE WITHOUT ESOPHAGITIS: ICD-10-CM

## 2018-09-25 DIAGNOSIS — I10 ESSENTIAL HYPERTENSION: ICD-10-CM

## 2018-09-25 DIAGNOSIS — M17.11 PRIMARY OSTEOARTHRITIS OF RIGHT KNEE: ICD-10-CM

## 2018-09-25 DIAGNOSIS — F03.90 DEMENTIA WITHOUT BEHAVIORAL DISTURBANCE, UNSPECIFIED DEMENTIA TYPE: Primary | ICD-10-CM

## 2018-09-25 PROCEDURE — 99309 SBSQ NF CARE MODERATE MDM 30: CPT | Performed by: NURSE PRACTITIONER

## 2018-09-25 NOTE — MR AVS SNAPSHOT
After Visit Summary   9/25/2018    Tia Garrett    MRN: [id number]           Patient Information     Date Of Birth          11/24/1927        Visit Information        Provider Department      9/25/2018 3:15 AM Ivone Dewey APRN CNP [facility]        Today's Diagnoses     Dementia without behavioral disturbance, unspecified dementia type    -  1    Essential hypertension        Gastroesophageal reflux disease without esophagitis        Primary osteoarthritis of right knee           Follow-ups after your visit        Who to contact     If you have questions or need follow up information about today's clinic visit or your schedule please contact [facility] directly at [phone].  Normal or non-critical lab and imaging results will be communicated to you by MyChart, letter or phone within 4 business days after the clinic has received the results. If you do not hear from us within 7 days, please contact the clinic through MyChart or phone. If you have a critical or abnormal lab result, we will notify you by phone as soon as possible.  Submit refill requests through [employer] or call your pharmacy and they will forward the refill request to us. Please allow 3 business days for your refill to be completed.          Additional Information About Your Visit        Care EveryWhere ID     This is your Care EveryWhere ID. This could be used by other organizations to access your [facility] medical records  [id number]         Blood Pressure from Last 3 Encounters:   08/01/18 (!) 185/104   09/12/17 118/82   07/31/17 132/82    Weight from Last 3 Encounters:   08/01/18 145 lb (65.8 kg)   09/12/17 130 lb 12.8 oz (59.3 kg)   07/31/17 129 lb (58.5 kg)              Today, you had the following     No orders found for display       Primary Care Provider Office Phone # Fax #    Yousuf Sanchez [phone]       [address] [place] [address]         Equal Access to Services     [facility]: Hadii elsa barron enrique Yarbrough, waadithyada luqadaha, qaybta karoyalvirgil kelly, joão mccain. So [facility] [phone].    ATENCIÓN: Si habla español, tiene a maurer disposición servicios gratuitos de asistencia lingüística. Nayanaame al [phone].    We comply with applicable federal civil rights laws and [place] laws. We do not discriminate on the basis of race, color, national origin, age, disability, sex, sexual orientation, or gender identity.            Thank you!     Thank you for choosing [facility] AND [facility]  for your care. Our goal is always to provide you with excellent care. Hearing back from our patients is one way we can continue to improve our services. Please take a few minutes to complete the written survey that you may receive in the mail after your visit with us. Thank you!             Your Updated Medication List - Protect others around you: Learn how to safely use, store and throw away your medicines at www.disposemymeds.org.          This list is accurate as of 9/25/18 11:59 PM.  Always use your most recent med list.                   Brand Name Dispense Instructions for use Diagnosis    * Acetaminophen 325 MG Caps     100 capsule    Take 650 mg by mouth 4 times daily And 650 mg q 24 hours PRN pain.    Chronic pain of both knees       * Acetaminophen 325 MG Caps     100 capsule    Take 650 mg by mouth every 4 hours as needed        ammonium lactate 12 % lotion    LAC-HYDRIN     Apply topically 2 times daily        BIOFREEZE 4 % Gel   Generic drug:  Menthol (Topical Analgesic)           traMADol 50 MG tablet    ULTRAM    10 tablet    Take 0.5 tablets (25 mg) by mouth every 6 hours as needed for severe pain Pt to take 12.5 mg q 6 hours prn pain. Epic would not allow me to enter this small of a dose.    Chronic pain of right knee       * Notice:  This list has 2 medication(s) that are the same as other medications  prescribed for you. Read the directions carefully, and ask your doctor or other care provider to review them with you.

## 2018-09-28 NOTE — PROGRESS NOTES
[facility]  60 DAY RECERTIFICATION    Patient Name: Tia Garrett  YOB: 1927 Age: 89 year old  Medical RecordNumber: [id number]  Primary Provider: Dr. Vela    Advanced Directives: DNR/DNI limit interventions    Date admitted to [facility]: 8/2017      HPI: Patient is seen today for 60 day evaluation for the following medical issues:  Dementia without behavioral disturbance, hypertension, gerd, osteoarthritis right knee.   Pt sitting at dining room table waiting for lunch. Right leg brace on but it does not appear to be fitting appropriately. Pt has hx of knee pain and knee giving out and according to physical therapy this brace should help with her while she is walking. Pt does not like to wear it.   Pt is overall feeling weaker. Physical therapy has been working with her.  Weight is up 10# from a year ago.   Pt is pleasantly confused.   BIMS 3/15      ALLERGIES:  No Known Allergies    Past Medical History:    has no past medical history on file.    Past Surgical History:   has a past surgical history that includes other surgical history; other surgical history; and other surgical history.    Family History:  family history is not on file.    Social History:   reports that she has never smoked. She has never used smokeless tobacco. She reports that she does not drink alcohol.    Immunizations:   Immunization History   Administered Date(s) Administered     Influenza (IIV3) PF 12/14/2012     Pneumo Conj 13-V (2010&after) 05/31/2016     TDAP Vaccine (Boostrix) 05/31/2016       Current Medications:   Current Outpatient Prescriptions   Medication     Acetaminophen 325 MG CAPS     Acetaminophen 325 MG CAPS     ammonium lactate (LAC-HYDRIN) 12 % lotion     Menthol, Topical Analgesic, (BIOFREEZE) 4 % GEL     traMADol (ULTRAM) 50 MG tablet     No current facility-administered medications for this visit.      Medication reconciliation completed between [facility] record and [facility] MAR.      Diagnostics/Labs (reviewed today):  Last Comprehensive Metabolic Panel:  Sodium   Date Value Ref Range Status   07/21/2017 140 133 - 143 mmol/L      Potassium   Date Value Ref Range Status   07/21/2017 3.5 3.5 - 5.1 mmol/L      Chloride   Date Value Ref Range Status   07/21/2017 106 98 - 107 mmol/L      Carbon Dioxide   Date Value Ref Range Status   07/21/2017 24 21 - 31 mmol/L      Anion Gap   Date Value Ref Range Status   04/29/2017 10 3 - 14 mmol/L Final     Glucose   Date Value Ref Range Status   07/21/2017 98 70 - 105 mg/dL      Urea Nitrogen   Date Value Ref Range Status   07/21/2017 16 7 - 25 mg/dL      Creatinine   Date Value Ref Range Status   07/21/2017 0.65 (L) 0.70 - 1.30 mg/dL      GFR Estimate   Date Value Ref Range Status   04/29/2017 68 >60 mL/min/1.7m2 Final     Comment:     Non  GFR Calc     Calcium   Date Value Ref Range Status   07/21/2017 9.1 8.6 - 10.3 mg/dL      Lab Results   Component Value Date    WBC 6.7 04/29/2017     Lab Results   Component Value Date    RBC 4.49 04/29/2017     Lab Results   Component Value Date    HGB 12.6 07/21/2017     Lab Results   Component Value Date    HCT 36.5 07/21/2017     No components found for: MCT  Lab Results   Component Value Date    MCV 92 07/21/2017     Lab Results   Component Value Date    MCH 31.6 07/21/2017     Lab Results   Component Value Date    MCHC 34.5 07/21/2017     Lab Results   Component Value Date    RDW 12.7 07/21/2017     Lab Results   Component Value Date     07/21/2017           Current Diet: Mechanical soft    Review Of Systems:    Limited ROS due to dementia     Vital Signs: (obtained from nursing home record)  Temp 98.1   Pulse 81   Resp 18   /88  O2 Sats 97%   Weight 147 #     Objective:  Pleasant and alert without distress. Affect pleasantly confused.   Sclera nonicteric, conjunctiva non-inflamed.  Skin color pink. Small pea-sized cyst by inner right eye. Not reddened or inflamed. Mucous membranes  moist.   Neck supple and without adenopathy   Lungs clear to auscultation throughout. No wheezes or rales noted.   Cardiovascular regular, S1, S2 auscultated. No murmur noted.  Abdomen soft and without masses, tenderness   Extremities without edema. DPPT  Gait is stable with walker and knee brace  Able to move upper and lower extremities        Assessment and Plan:  (F03.90) Dementia without behavioral disturbance, unspecified dementia type  (primary encounter diagnosis)  Comment: Progressive  Plan: Supportive cares, tylenol and ultram for pain    (I10) Essential hypertension  Comment: stable, slightly on higher end 140-160s at times  Plan: Currently no medications-continue to monitor    (K21.9) Gastroesophageal reflux disease without esophagitis  Comment: stable  Plan: currently no medications    (M17.11) Primary osteoarthritis of right knee  Comment: controlled pain  Plan: tylenol, ultram, biofreeze, right knee brace for support. PT to see again re: proper fit of knee brace.         Multidisciplinary notes, laboratory values, medications, vital signs, weight and orders are reviewed from nursing home records. I have reviewed the patient s medical history and updated the computerized patient record.     A total of 24 minutes was spent with this patient, of which more than 50% was spent in counseling and/or coordination of care.   SINGH Romero  9/27/2018 8:51 PM

## 2018-10-07 ENCOUNTER — TRANSFERRED RECORDS (OUTPATIENT)
Dept: HEALTH INFORMATION MANAGEMENT | Facility: OTHER | Age: 83
End: 2018-10-07

## 2018-10-08 ENCOUNTER — TELEPHONE (OUTPATIENT)
Dept: FAMILY MEDICINE | Facility: OTHER | Age: 83
End: 2018-10-08

## 2018-10-08 NOTE — TELEPHONE ENCOUNTER
"Notified Terra, Nurse at [facility] of providers note. She (Terra) stated \"Jamaaljudy Harman\" after I read her the providers note.  Dana Ortega ....................  10/8/2018   10:50 AM      "

## 2018-10-08 NOTE — TELEPHONE ENCOUNTER
Called Kadi and informed her of no fracture. She states she already knew.   Adwoa Miles LPN on 10/8/2018 at 11:50 AM

## 2018-10-08 NOTE — TELEPHONE ENCOUNTER
Patient has a lot of pain in her legs, They are looking for an increase in her ultram, she is currently at 12.5  Thank you.

## 2018-10-08 NOTE — TELEPHONE ENCOUNTER
Ordered by Ivone Dewey NP. Have her assess and change if needed.  Marisel Vela MD  10:42 AM 10/8/2018

## 2018-10-09 ENCOUNTER — NURSING HOME VISIT (OUTPATIENT)
Dept: GERIATRICS | Facility: OTHER | Age: 83
End: 2018-10-09
Attending: NURSE PRACTITIONER
Payer: COMMERCIAL

## 2018-10-09 DIAGNOSIS — M17.11 PRIMARY OSTEOARTHRITIS OF RIGHT KNEE: Primary | ICD-10-CM

## 2018-10-09 DIAGNOSIS — G89.29 CHRONIC PAIN OF RIGHT KNEE: ICD-10-CM

## 2018-10-09 DIAGNOSIS — M25.561 CHRONIC PAIN OF RIGHT KNEE: ICD-10-CM

## 2018-10-09 PROCEDURE — 99308 SBSQ NF CARE LOW MDM 20: CPT | Performed by: NURSE PRACTITIONER

## 2018-10-12 RX ORDER — TRAMADOL HYDROCHLORIDE 50 MG/1
25 TABLET ORAL EVERY 6 HOURS PRN
Qty: 10 TABLET | Refills: 0 | Status: SHIPPED | OUTPATIENT
Start: 2018-10-12 | End: 2019-08-09

## 2018-10-12 NOTE — PROGRESS NOTES
[facility]  Acute Care Visit    Patient Name: Tia Garrett   : 1927  MRN: [id number]    Place of Service: [facility]  DOS: 10/9/2018    CC: worsening right knee pain    HPI:  Tia Garrett is a 89 year old female with PMH of multiple chronic medical concerns, who is seen today regarding worsening right knee pain.  Nursing staff reports pt is declining as she is not ambulating as well anymore. She had been working with physical therapy and a knee brace was supplied but it is painful for her to wear and nursing staff has trouble getting it on right.     Multidisciplinary notes, laboratory values, medications, vital signs, weight and orders arereviewed from nursing home records. I have reviewed the patient s medical history and updated the computerized patient record.     PMH:  No past medical history on file.    Medications:Ms.  Current Outpatient Prescriptions   Medication     Acetaminophen 325 MG CAPS     Acetaminophen 325 MG CAPS     ammonium lactate (LAC-HYDRIN) 12 % lotion     Menthol, Topical Analgesic, (BIOFREEZE) 4 % GEL     traMADol (ULTRAM) 50 MG tablet     No current facility-administered medications for this visit.        Medication reconciliation complete between [facility] record and NH MAR.     Allergies:  No Known Allergies    Review of Systems:  Limited ROS due to dementia. See HPI.   Right knee pain with standing  Denies left knee pain      Vital Signs:  Temp 98.0   Pulse 82   Respirations 18   /84  Oxygen Sats 94%      Physical Exam:  Pleasant and alert without distress. Confused.   Lungs clear to auscultation throughout. No wheezes or rales noted.   Cardiovascular regular, S1, S2 auscultated. No murmur noted.  Extremities without edema. Gait is unstable.   Able to move upper and lower extremities       Assessment/Plan:  (M17.11) Primary osteoarthritis of right knee  (primary encounter diagnosis)  Comment: chronic-worsening  Plan: Increase tramadol to 25 mg q 6  hours, continue brace on right leg per recommendations of Physical therapy.    A total of 18 minutes was spent with this patient, of which more than 50% was spent in counseling and/or coordination of care.     CRESENCIO Romero,BARRETT ....................  10/11/2018   9:30 PM

## 2018-10-25 ENCOUNTER — RESULTS ONLY (OUTPATIENT)
Dept: LAB | Age: 83
End: 2018-10-25

## 2018-10-25 ENCOUNTER — MEDICAL CORRESPONDENCE (OUTPATIENT)
Dept: HEALTH INFORMATION MANAGEMENT | Facility: OTHER | Age: 83
End: 2018-10-25

## 2018-10-25 LAB
ALBUMIN UR-MCNC: NEGATIVE MG/DL
APPEARANCE UR: CLEAR
BILIRUB UR QL STRIP: NEGATIVE
COLOR UR AUTO: YELLOW
GLUCOSE UR STRIP-MCNC: NEGATIVE MG/DL
HGB UR QL STRIP: NEGATIVE
KETONES UR STRIP-MCNC: NEGATIVE MG/DL
LEUKOCYTE ESTERASE UR QL STRIP: NEGATIVE
NITRATE UR QL: NEGATIVE
PH UR STRIP: 5.5 PH (ref 5–9)
SOURCE: NORMAL
SP GR UR STRIP: 1.02 (ref 1–1.03)
UROBILINOGEN UR STRIP-ACNC: 0.2 EU/DL (ref 0.2–1)

## 2018-10-27 LAB
BACTERIA SPEC CULT: NORMAL
SPECIMEN SOURCE: NORMAL

## 2018-11-06 ENCOUNTER — NURSING HOME VISIT (OUTPATIENT)
Dept: GERIATRICS | Facility: OTHER | Age: 83
End: 2018-11-06
Attending: NURSE PRACTITIONER
Payer: COMMERCIAL

## 2018-11-06 DIAGNOSIS — L72.3 SEBACEOUS CYST: ICD-10-CM

## 2018-11-06 PROCEDURE — 99309 SBSQ NF CARE MODERATE MDM 30: CPT | Performed by: NURSE PRACTITIONER

## 2018-11-06 NOTE — MR AVS SNAPSHOT
"              After Visit Summary   2018    Tia Garrett    MRN: [id number]           Patient Information     Date Of Birth          1927        Visit Information        Provider Department      2018 4:00 AM Ivone Dewey APRN CNP [facility]        Today's Diagnoses     Sebaceous cyst           Follow-ups after your visit        Who to contact     If you have questions or need follow up information about today's clinic visit or your schedule please contact [facility] directly at [phone].  Normal or non-critical lab and imaging results will be communicated to you by Cimagine Mediahart, letter or phone within 4 business days after the clinic has received the results. If you do not hear from us within 7 days, please contact the clinic through SavvySystemst or phone. If you have a critical or abnormal lab result, we will notify you by phone as soon as possible.  Submit refill requests through Happiest Minds or call your pharmacy and they will forward the refill request to us. Please allow 3 business days for your refill to be completed.          Additional Information About Your Visit        MyChart Information     Happiest Minds lets you send messages to your doctor, view your test results, renew your prescriptions, schedule appointments and more. To sign up, go to [email or web address] . Click on \"Log in\" on the left side of the screen, which will take you to the Welcome page. Then click on \"Sign up Now\" on the right side of the page.     You will be asked to enter the access code listed below, as well as some personal information. Please follow the directions to create your username and password.     Your access code is: [email or web address]  Expires: 2019 10:17 AM     Your access code will  in 90 days. If you need help or a new code, please call your [place] clinic or [phone].        Care EveryWhere ID     This is your Care EveryWhere ID. This could be used by other " organizations to access your [place] medical records  [id number]         Blood Pressure from Last 3 Encounters:   08/01/18 (!) 185/104   09/12/17 118/82   07/31/17 132/82    Weight from Last 3 Encounters:   08/01/18 145 lb (65.8 kg)   09/12/17 130 lb 12.8 oz (59.3 kg)   07/31/17 129 lb (58.5 kg)              Today, you had the following     No orders found for display       Primary Care Provider Office Phone # Fax #    Yousuf Sanchez [phone]       [address] [place] [id number]        Equal Access to Services     [facility] : Hadii elsa barron hadasho Sojuanali, waaxda luqadaha, qaybta kaalmada adeegyada, joão nicholas . So [facility] [phone].    ATENCIÓN: Si habla español, tiene a maurer disposición servicios gratuitos de asistencia lingüística. [facility] [phone].    We comply with applicable federal civil rights laws and [place] laws. We do not discriminate on the basis of race, color, national origin, age, disability, sex, sexual orientation, or gender identity.            Thank you!     Thank you for choosing [facility] AND [facility]  for your care. Our goal is always to provide you with excellent care. Hearing back from our patients is one way we can continue to improve our services. Please take a few minutes to complete the written survey that you may receive in the mail after your visit with us. Thank you!             Your Updated Medication List - Protect others around you: Learn how to safely use, store and throw away your medicines at www.disposemymeds.org.          This list is accurate as of 11/6/18 11:59 PM.  Always use your most recent med list.                   Brand Name Dispense Instructions for use Diagnosis    * Acetaminophen 325 MG Caps     100 capsule    Take 650 mg by mouth 4 times daily And 650 mg q 24 hours PRN pain.    Chronic pain of both knees       * Acetaminophen 325 MG Caps     100 capsule    Take 650 mg by mouth every 4  hours as needed        ammonium lactate 12 % lotion    LAC-HYDRIN     Apply topically 2 times daily        BIOFREEZE 4 % Gel   Generic drug:  Menthol (Topical Analgesic)           traMADol 50 MG tablet    ULTRAM    10 tablet    Take 0.5 tablets (25 mg) by mouth every 6 hours as needed for severe pain    Chronic pain of right knee       * Notice:  This list has 2 medication(s) that are the same as other medications prescribed for you. Read the directions carefully, and ask your doctor or other care provider to review them with you.

## 2018-11-14 PROBLEM — L72.3 SEBACEOUS CYST: Status: ACTIVE | Noted: 2018-11-14

## 2018-11-14 RX ORDER — AMOXICILLIN 500 MG/1
500 TABLET, FILM COATED ORAL 3 TIMES DAILY
Qty: 21 TABLET | Refills: 0 | Status: SHIPPED | OUTPATIENT
Start: 2018-11-14 | End: 2018-11-14

## 2018-11-14 NOTE — PROGRESS NOTES
[facility]  Acute Care Visit    Patient Name: Tia Garrett   : 1927  MRN: [id number]    Place of Service: [facility]  DOS: 2018    CC: cyst by right eye    HPI:  Tia Garrett is a 89 year old female with PMH of multiple chronic medical concerns, who is seen today regarding enlarging cyst by inner right eye. Staff has been applying warm packs but continues to worsen. It is now looking inflamed, red, sore to touch and irritated by glasses. Talked with primary family member Asmita and discussed starting antibiotics and possible need for lancing it. This is the second time cyst has appeared and needing antibiotics. Asmita would like to hold off on lancing it at this time and just try antibiotics again. Focus is comfort for her.     Multidisciplinary notes, laboratory values, medications, vital signs, weight and orders arereviewed from nursing home records. I have reviewed the patient s medical history and updated the computerized patient record.     PMH:  No past medical history on file.    Medications:  Current Outpatient Prescriptions   Medication     Acetaminophen 325 MG CAPS     Acetaminophen 325 MG CAPS     ammonium lactate (LAC-HYDRIN) 12 % lotion     Menthol, Topical Analgesic, (BIOFREEZE) 4 % GEL     traMADol (ULTRAM) 50 MG tablet     No current facility-administered medications for this visit.        Medication reconciliation complete between [facility] record and NH MAR.     Allergies:  No Known Allergies    Review of Systems:  Limited ROS due to dementia. See HPI.       Vital Signs:  Temp 97.6   Pulse 79   Respirations 18   /88   Oxygen Sats 97%   Weight 149.8#    Physical Exam:   Pleasant and alert without distress.   Sclera nonicteric, conjunctiva non-inflamed.  Cyst bulging inner right eye, appears red, inflamed, tender to palpation. NO drainage.   Lungs clear to auscultation throughout. No wheezes or rales noted.   Cardiovascular regular, S1, [id number]. No  murmur noted.          Assessment/Plan:  (L72.3) Sebaceous cyst  Comment: worsening, looking infected  Plan: Amoxicillin 500 mg q 8 hours x 7 days, apply warm compress to cyst QID for comfort        A total of 25 minutes was spent with this patient, of which more than 50% was spent in counseling and/or coordination of care.     CRESENCIO Romero, CNP ....................  11/14/2018   10:08 AM

## 2018-11-29 ENCOUNTER — NURSING HOME VISIT (OUTPATIENT)
Dept: GERIATRICS | Facility: OTHER | Age: 83
End: 2018-11-29
Attending: FAMILY MEDICINE
Payer: COMMERCIAL

## 2018-11-29 DIAGNOSIS — F03.90 DEMENTIA WITHOUT BEHAVIORAL DISTURBANCE, UNSPECIFIED DEMENTIA TYPE: ICD-10-CM

## 2018-11-29 DIAGNOSIS — M17.11 PRIMARY OSTEOARTHRITIS OF RIGHT KNEE: ICD-10-CM

## 2018-11-29 DIAGNOSIS — I10 ESSENTIAL HYPERTENSION: ICD-10-CM

## 2018-11-29 DIAGNOSIS — Z78.9 NURSING HOME RESIDENT: Primary | ICD-10-CM

## 2018-11-29 PROCEDURE — 99308 SBSQ NF CARE LOW MDM 20: CPT | Performed by: FAMILY MEDICINE

## 2018-11-29 NOTE — PROGRESS NOTES
60 day MD visit: [facility]    SUBJECTIVE:   Tia Garrett is a 89 year old female who presents to clinic today for the following health issues:  Seen for routine MD visit. Staff notes increasing confusion and at times seems more anxious. Pain managed(DJD in knees)     HPI    Patient Active Problem List   Diagnosis     Essential hypertension     Hiatal hernia     GERD (gastroesophageal reflux disease)     ACP (advance care planning)     Dementia without behavioral disturbance, unspecified dementia type     Dementia in Alzheimer's disease     DNR (do not resuscitate)     Hematuria     Nursing home resident     Wears hearing aid     Primary osteoarthritis of right knee     Sebaceous cyst     Past Surgical History:   Procedure Laterality Date     OTHER SURGICAL HISTORY      1943,SUR38,APPENDECTOMY OPEN     OTHER SURGICAL HISTORY      SHX83,TOTAL ABDOMINAL HYSTERECTOMY W/ BILATERAL SALPINGOOPHORECTOMY     OTHER SURGICAL HISTORY      07/2015,[id number],HIP FRACTURE TX,Right       Social History   Substance Use Topics     Smoking status: Never Smoker     Smokeless tobacco: Never Used     Alcohol use No     No family history on file.      Current Outpatient Prescriptions   Medication Sig Dispense Refill     Acetaminophen 325 MG CAPS Take 650 mg by mouth every 4 hours as needed 100 capsule 0     Acetaminophen 325 MG CAPS Take 650 mg by mouth 4 times daily And 650 mg q 24 hours PRN pain. 100 capsule      ammonium lactate (LAC-HYDRIN) 12 % lotion Apply topically 2 times daily       Menthol, Topical Analgesic, (BIOFREEZE) 4 % GEL        traMADol (ULTRAM) 50 MG tablet Take 0.5 tablets (25 mg) by mouth every 6 hours as needed for severe pain 10 tablet 0     No Known Allergies    Review of Systems   Unable to perform ROS: Dementia        OBJECTIVE:     Vitals reviewed from facility flowsheet and stable    Physical Exam   Constitutional: She appears well-developed. No distress.   Cardiovascular: Normal rate and normal heart  sounds.    Pulmonary/Chest: Effort normal and breath sounds normal. No respiratory distress.   Skin: Skin is warm and dry.   Psychiatric: She has a normal mood and affect.   Confused, word find difficult.   Nursing note and vitals reviewed.      Diagnostic Test Results:  none     ASSESSMENT/PLAN:           ICD-10-CM    1. Nursing home resident Z59.3    2. Dementia without behavioral disturbance, unspecified dementia type F03.90    3. Essential hypertension I10    4. Primary osteoarthritis of right knee M17.11      Plan:  No change in orders or care plan made today.      Marisel Vela MD  [facility] AND [facility]

## 2018-11-29 NOTE — MR AVS SNAPSHOT
"              After Visit Summary   2018    Tia Garrett    MRN: [id number]           Patient Information     Date Of Birth          1927        Visit Information        Provider Department      2018 3:00 AM Marisel Vela MD [facility]        Today's Diagnoses     Nursing home resident    -  1    Dementia without behavioral disturbance, unspecified dementia type        Essential hypertension        Primary osteoarthritis of right knee           Follow-ups after your visit        Who to contact     If you have questions or need follow up information about today's clinic visit or your schedule please contact [facility] AND [facility] directly at [phone].  Normal or non-critical lab and imaging results will be communicated to you by NGRAINhart, letter or phone within 4 business days after the clinic has received the results. If you do not hear from us within 7 days, please contact the clinic through NGRAINhart or phone. If you have a critical or abnormal lab result, we will notify you by phone as soon as possible.  Submit refill requests through BloggersBase or call your pharmacy and they will forward the refill request to us. Please allow 3 business days for your refill to be completed.          Additional Information About Your Visit        MyChart Information     BloggersBase lets you send messages to your doctor, view your test results, renew your prescriptions, schedule appointments and more. To sign up, go to [email or web address] . Click on \"Log in\" on the left side of the screen, which will take you to the Welcome page. Then click on \"Sign up Now\" on the right side of the page.     You will be asked to enter the access code listed below, as well as some personal information. Please follow the directions to create your username and password.     Your access code is: [email or web address]  Expires: 2019 10:17 AM     Your access code will  in 90 days. If you need " help or a new code, please call your Center clinic or [phone].        Care EveryWhere ID     This is your Care EveryWhere ID. This could be used by other organizations to access your Center medical records  [id number]         Blood Pressure from Last 3 Encounters:   08/01/18 (!) 185/104   09/12/17 118/82   07/31/17 132/82    Weight from Last 3 Encounters:   08/01/18 145 lb (65.8 kg)   09/12/17 130 lb 12.8 oz (59.3 kg)   07/31/17 129 lb (58.5 kg)              Today, you had the following     No orders found for display       Primary Care Provider Office Phone # Fax #    Marisel Pilar Vela [phone]       [address] [facility] [address]        Equal Access to Services     [facility]: Hadii aad beatrice hadasho Soomaali, waaxda luqadaha, qaybta kaalmada ademarkyada, joão nicholas . So [facility] [phone].    ATENCIÓN: Si habla español, tiene a maurer disposición servicios gratuitos de asistencia lingüística. Jeramie al [phone].    We comply with applicable federal civil rights laws and [place] laws. We do not discriminate on the basis of race, color, national origin, age, disability, sex, sexual orientation, or gender identity.            Thank you!     Thank you for choosing [facility] AND [facility]  for your care. Our goal is always to provide you with excellent care. Hearing back from our patients is one way we can continue to improve our services. Please take a few minutes to complete the written survey that you may receive in the mail after your visit with us. Thank you!             Your Updated Medication List - Protect others around you: Learn how to safely use, store and throw away your medicines at www.disposemymeds.org.          This list is accurate as of 11/29/18  4:21 PM.  Always use your most recent med list.                   Brand Name Dispense Instructions for use Diagnosis    * Acetaminophen 325 MG Caps     100 capsule    Take 650  mg by mouth 4 times daily And 650 mg q 24 hours PRN pain.    Chronic pain of both knees       * Acetaminophen 325 MG Caps     100 capsule    Take 650 mg by mouth every 4 hours as needed        ammonium lactate 12 % external lotion    LAC-HYDRIN     Apply topically 2 times daily        BIOFREEZE 4 % Gel   Generic drug:  Menthol (Topical Analgesic)           traMADol 50 MG tablet    ULTRAM    10 tablet    Take 0.5 tablets (25 mg) by mouth every 6 hours as needed for severe pain    Chronic pain of right knee       * Notice:  This list has 2 medication(s) that are the same as other medications prescribed for you. Read the directions carefully, and ask your doctor or other care provider to review them with you.

## 2018-12-06 ENCOUNTER — NURSING HOME VISIT (OUTPATIENT)
Dept: GERIATRICS | Facility: OTHER | Age: 83
End: 2018-12-06
Attending: NURSE PRACTITIONER
Payer: COMMERCIAL

## 2018-12-06 DIAGNOSIS — R07.89 MUSCULOSKELETAL CHEST PAIN: Primary | ICD-10-CM

## 2018-12-06 PROCEDURE — 99308 SBSQ NF CARE LOW MDM 20: CPT | Performed by: NURSE PRACTITIONER

## 2018-12-12 ENCOUNTER — NURSING HOME VISIT (OUTPATIENT)
Dept: GERIATRICS | Facility: OTHER | Age: 83
End: 2018-12-12
Attending: NURSE PRACTITIONER
Payer: COMMERCIAL

## 2018-12-12 DIAGNOSIS — J00 ACUTE NASOPHARYNGITIS: Primary | ICD-10-CM

## 2018-12-12 DIAGNOSIS — H10.31 ACUTE BACTERIAL CONJUNCTIVITIS OF RIGHT EYE: ICD-10-CM

## 2018-12-12 PROCEDURE — 99308 SBSQ NF CARE LOW MDM 20: CPT | Performed by: NURSE PRACTITIONER

## 2018-12-15 NOTE — PROGRESS NOTES
[facility]  Acute Care Visit    Patient Name: Tia Garrett   : 1927  MRN: [id number]    Place of Service: [facility]  DOS: 2018    CC: muscle discomfort in chest and right side of ribs    HPI:  Tia Garrett is a 89 year old female with PMH of multiple chronic medical concerns, who is seen today regarding nursing staff noted pt was having some discomfort with movement and deep breathing and seemed to localize to right side of chest and ribs. Pt has not fallen. Walks minimal distance with walker and help. She is pleasant today resting in recliner chair. Dtr is here by her side during this assessment. Pain is only slightly able to be elicited upon palpation of chest and noted no pain right side of ribs. She is feeling well today. NO coughing. Sleeping more the past couple months, slowly declining overall. Otherwise appears comfortable. Goal is comfort cares.       Multidisciplinary notes, laboratory values, medications, vital signs, weight and orders arereviewed from nursing home records. I have reviewed the patient s medical history and updated the computerized patient record.     PMH:  No past medical history on file.    Medications:  Current Outpatient Medications   Medication     Acetaminophen 325 MG CAPS     Acetaminophen 325 MG CAPS     ammonium lactate (LAC-HYDRIN) 12 % lotion     Menthol, Topical Analgesic, (BIOFREEZE) 4 % GEL     traMADol (ULTRAM) 50 MG tablet     No current facility-administered medications for this visit.        Medication reconciliation complete between [facility] record and NH MAR.     Allergies:  No Known Allergies    Review of Systems:  Limited ROS due to dementia. See HPI.       Vital Signs:  Temp 97.6   Pulse 75   Respirations 18   /83   Oxygen Sats 96%   Weight 150.4#    Physical Exam:     Pleasant and alert without distress. Confused.   Sclera nonicteric, conjunctivanon-inflamed.  Skin color pink. No rash notedMucous membranes moist.   Neck  supple and without adenopathy  Upon palpation of chest some mild discomfort elicited. Right side of ribs not any discomfort at this time to palpation or movement  Or deep breath.     Lungs clear to auscultation throughout. No wheezes or rales noted.   Cardiovascular regular, S1, [id number]. No murmur noted.      Assessment/Plan:  (R07.89) Musculoskeletal chest pain  (primary encounter diagnosis)  Comment: also right side pain  Plan: Does not seem cardiac related, hurts more with palpation and taking a deep breath initially. Not having significant pain with this assessment.    May use tylenol, tramadol prn, Ice or heat prn. Update NP if worsens.         A total of 15 minutes was spent with this patient, of which more than 50% was spent in counseling and/or coordination of care.     CRESENCIO Romero, CNP ....................  12/15/2018   2:02 PM

## 2018-12-18 ENCOUNTER — MEDICAL CORRESPONDENCE (OUTPATIENT)
Dept: LAB | Facility: OTHER | Age: 83
End: 2018-12-18

## 2018-12-18 ENCOUNTER — RESULTS ONLY (OUTPATIENT)
Dept: LAB | Age: 83
End: 2018-12-18

## 2018-12-19 LAB
GRAM STN SPEC: ABNORMAL
SPECIMEN SOURCE: ABNORMAL

## 2018-12-19 NOTE — PROGRESS NOTES
[facility]  Acute Care Visit    Patient Name: Tia Garrett   : 1927  MRN: [id number]    Place of Service: [facility]  DOS: 2018    CC: head congestion, drainage from eyes, low grade fever    HPI:  Tia Garrett is a 89 year old female with PMH of multiple chronic medical concerns, who is seen today regarding pt is having a low grade fever, stuffy nose, dry cough and a moderate amt green drainage coming from both eyes. She says she feels tired. Has needed more help with feeding, encouraging fluids. Denies pain. No headache. Sleeping well at night and able to rest during the day.       Multidisciplinary notes, laboratory values, medications, vital signs, weight and orders arereviewed from nursing home records. I have reviewed the patient s medical history and updated the computerized patient record.     PMH:  No past medical history on file.    Medications:  Current Outpatient Medications   Medication     Acetaminophen 325 MG CAPS     Acetaminophen 325 MG CAPS     ammonium lactate (LAC-HYDRIN) 12 % lotion     Menthol, Topical Analgesic, (BIOFREEZE) 4 % GEL     traMADol (ULTRAM) 50 MG tablet     No current facility-administered medications for this visit.        Medication reconciliation complete between [facility] record and NH MAR.     Allergies:  No Known Allergies    Review of Systems:  Limited ROS due to dementia. See HPI.       Vital Signs:  Temp 98.7   Pulse 95   Respirations 18  /101   Oxygen Sats 93%   Weight 150#    Physical Exam:     Pleasantly confused and alert without distress. Appears tired and fatigued.   Sclera nonicteric, conjunctiva inflamed with green thick drainage.   Skin color pink. Neck supple and without adenopathy.  Lungs clear to auscultation throughout. No wheezes or rales noted.   Cardiovascular regular, S1, [id number]. No murmur noted.      Assessment/Plan:  (J00) Acute nasopharyngitis  (primary encounter diagnosis)  Comment: Acute, dry  cough  Plan: Push fluids, cough medicine prn    (H10.31) Acute bacterial conjunctivitis of right eye  Comment: acute  Plan: Polymyxin B/Trimethoprim 10,000 units/mg 1 gtt QID right eye x 7 days.   Wash with baby shampoo daily and apply warm moist cloth QID for comfort measures.         A total of 24 minutes was spent with this patient, of which more than 50% was spent in counseling and/or coordination of care.     CRESENCIO Romero, CNP ....................  12/19/2018   4:35 PM

## 2018-12-21 LAB
BACTERIA SPEC CULT: ABNORMAL
SPECIMEN SOURCE: ABNORMAL

## 2019-01-15 ENCOUNTER — NURSING HOME VISIT (OUTPATIENT)
Dept: GERIATRICS | Facility: OTHER | Age: 84
End: 2019-01-15
Attending: NURSE PRACTITIONER
Payer: COMMERCIAL

## 2019-01-15 DIAGNOSIS — L72.3 SEBACEOUS CYST: ICD-10-CM

## 2019-01-15 DIAGNOSIS — K21.9 GASTROESOPHAGEAL REFLUX DISEASE WITHOUT ESOPHAGITIS: ICD-10-CM

## 2019-01-15 DIAGNOSIS — F03.90 DEMENTIA WITHOUT BEHAVIORAL DISTURBANCE, UNSPECIFIED DEMENTIA TYPE: Primary | ICD-10-CM

## 2019-01-15 DIAGNOSIS — I10 ESSENTIAL HYPERTENSION: ICD-10-CM

## 2019-01-15 DIAGNOSIS — M17.11 PRIMARY OSTEOARTHRITIS OF RIGHT KNEE: ICD-10-CM

## 2019-01-15 PROCEDURE — 99309 SBSQ NF CARE MODERATE MDM 30: CPT | Performed by: NURSE PRACTITIONER

## 2019-01-16 NOTE — PROGRESS NOTES
[facility]  60 DAY RECERTIFICATION    Patient Name: Tia Garrett  YOB: 1927 Age: 89 year old  Medical RecordNumber: [id number]  Primary Provider: Dr. Vela    Advanced Directives: DNR limit interventions    Date admitted to [facility]: 8/2017      HPI: Patient is seen today for 60 day evaluation for the following medical issues:  Dementia, GERD, hypertension, osteoarthritis of right knee, and sebaceous cyst.   Pt has been slowly declining, progression of dementia. Goal is comfort. She had a significant respiratory infection treated with antibiotic and this has now resolved. She had a sebaceous cyst by her inner eye that keeps flaring up and also treated with antibiotics and this has now resolved. She does continue to have knee pain but does not like to wear her knee brace as it seems to hurt worse. She has been taking tramadol for pain and is more comfortable now, along with scheduled tylenol.   Pt had been having some vaginal bleeding, which she has had in the past as well. She is also having vaginal itchiness. Increased risk for fungal infection with recent treatments with antibiotics.   Weight is stable.   Sleeps well at night.       ALLERGIES:  No Known Allergies    Past Medical History:    has no past medical history on file.    Past Surgical History:   has a past surgical history that includes other surgical history; other surgical history; and other surgical history.    Family History:  family history is not on file.    Social History:   reports that  has never smoked. she has never used smokeless tobacco. She reports that she does not drink alcohol.    Immunizations:   Immunization History   Administered Date(s) Administered     Influenza (IIV3) PF 12/14/2012     Pneumo Conj 13-V (2010&after) 05/31/2016     TDAP Vaccine (Boostrix) 05/31/2016       Current Medications:   Current Outpatient Medications   Medication     Acetaminophen 325 MG CAPS     Acetaminophen  "325 MG CAPS     ammonium lactate (LAC-HYDRIN) 12 % lotion     Menthol, Topical Analgesic, (BIOFREEZE) 4 % GEL     traMADol (ULTRAM) 50 MG tablet     No current facility-administered medications for this visit.      Medication reconciliation completed between [facility] record and NH MAR.     Diagnostics/Labs (reviewed today):  No routine labs-focus on comfort unless necessary.     Current Diet: Mechanical soft    Review Of Systems:    Limited ROS.  Denies pain  No coughing   No chest pain or shortness of breath  \"feeling ok\"    Vital Signs: (obtained from nursing home record)  Temp 98.6   Pulse 74   Resp 20   /88   O2 Sats 97%   Weight 145 # stable    Objective:     Pleasant and alert without distress. Simple answers.    Sclera nonicteric, conjunctiva non-inflamed.  Sebaceous cyst by inner eye is now healed. Skin color pink. Mucous membranes moist.   Neck supple and without adenopathy   Lungs clear to auscultation throughout. No wheezes or rales noted.   Cardiovascular regular, S1, S2 auscultated.   Abdomen soft and without tenderness   Extremities without edema.    Able to move upper and lower extremities       Assessment and Plan:  (F03.90) Dementia without behavioral disturbance, unspecified dementia type  (primary encounter diagnosis)  Comment: progressive  Plan: Supportive cares, comfort focus    (K21.9) Gastroesophageal reflux disease without esophagitis  Comment: stable  Plan: Currently not taking any medications    (I10) Essential hypertension  Comment: stable  Plan: Currently taking no medications for blood pressure    (M17.11) Primary osteoarthritis of right knee  Comment: chronic  Plan: Knee brace if feels comfortable for her, tramadol and tylenol for pain    (L72.3) Sebaceous cyst  Comment: resolved  Plan: Cont to monitor for re-occurence and use warm moist packs to help drain to prevent worsening        Multidisciplinary notes, laboratory values, medications, vital signs, weight and orders are reviewed " from nursing home records. I have reviewed the patient s medical history and updated the computerized patient record.     A total of 32 minutes was spent with this patient, of which more than 50% was spent in counseling and/or coordination of care.   CRESENCIO Romero, CNP  1/16/2019 3:30 PM

## 2019-02-14 ENCOUNTER — NURSING HOME VISIT (OUTPATIENT)
Dept: GERIATRICS | Facility: OTHER | Age: 84
End: 2019-02-14
Attending: NURSE PRACTITIONER
Payer: COMMERCIAL

## 2019-02-14 DIAGNOSIS — J00 ACUTE NASOPHARYNGITIS: Primary | ICD-10-CM

## 2019-02-14 PROCEDURE — 99309 SBSQ NF CARE MODERATE MDM 30: CPT | Performed by: NURSE PRACTITIONER

## 2019-02-15 ENCOUNTER — MEDICAL CORRESPONDENCE (OUTPATIENT)
Dept: HEALTH INFORMATION MANAGEMENT | Facility: OTHER | Age: 84
End: 2019-02-15

## 2019-02-15 ENCOUNTER — RESULTS ONLY (OUTPATIENT)
Dept: LAB | Age: 84
End: 2019-02-15

## 2019-02-15 LAB
FLUAV+FLUBV RNA SPEC QL NAA+PROBE: NEGATIVE
FLUAV+FLUBV RNA SPEC QL NAA+PROBE: NEGATIVE
RSV RNA SPEC NAA+PROBE: NEGATIVE
SPECIMEN SOURCE: NORMAL

## 2019-02-15 NOTE — PROGRESS NOTES
"[facility]  Acute Care Visit    Patient Name: Tia Garrett   : 1927  MRN: [id number]    Place of Service: [facility]  DOS: 2019    CC: fever, resp symptoms    HPI:  Tia Garrett is a 89 year old female with PMH of multiple chronic medical concerns, who is seen today regarding yesterday pt started appearing ill with resp symptoms. Fever developed last night with Tmax 101.3.  She continues to feel ill today, dry cough, watery drainage from eyes. Fatigued. Seems more confused than her baseline.       Multidisciplinary notes, laboratory values, medications, vital signs, weight and orders arereviewed from nursing home records. I have reviewed the patient s medical history and updated the computerized patient record.     PMH:  No past medical history on file.    Medications:  Current Outpatient Medications   Medication     Acetaminophen 325 MG CAPS     Acetaminophen 325 MG CAPS     ammonium lactate (LAC-HYDRIN) 12 % lotion     Menthol, Topical Analgesic, (BIOFREEZE) 4 % GEL     traMADol (ULTRAM) 50 MG tablet     No current facility-administered medications for this visit.        Medication reconciliation complete between [facility] record and NH MAR.     Allergies:  No Known Allergies    Review of Systems:  Limited ROS due to dementia. See HPI.       Vital Signs:  Temp 101.3   Pulse 107   Respirations 24   /93   Oxygen Sats 96%   Weight 145#    Physical Exam:     Pleasantly confused, unable to answer questions as she keeps saying, \"what?\"  Appears ill and exhausted  Sclera nonicteric, conjunctiva non-inflamed. Clear drainage, watery right eye.   Skin color pink. Mucous membranes moist.   Neck supple and without adenopathy   Lungs clear/dim with faint exp wheezes    Cardiovascular regular, S1, S2 auscultated. No murmur noted.  Abdomen soft and without tenderness   Extremities without edema.          Assessment/Plan:  (J00) Acute nasopharyngitis  (primary encounter " diagnosis)  Comment: acute with fever up to 101.3  Plan: check influenza swab, start augmentin if negative        A total of 32 minutes was spent with this patient, of which more than 50% was spent in counseling and/or coordination of care.     CRESENCIO Romero, CNP ....................  2/15/2019   5:52 PM

## 2019-02-18 ENCOUNTER — TELEPHONE (OUTPATIENT)
Dept: FAMILY MEDICINE | Facility: OTHER | Age: 84
End: 2019-02-18

## 2019-02-18 DIAGNOSIS — R89.4 INFLUENZA A VIRUS NOT DETECTED: Primary | ICD-10-CM

## 2019-02-18 NOTE — TELEPHONE ENCOUNTER
Spoke with [facility] and Influenza A is going around and they need to have this checked for dosing the tamaflu.  Dana Ortega ....................  2/18/2019   3:52 PM

## 2019-02-19 ENCOUNTER — MEDICAL CORRESPONDENCE (OUTPATIENT)
Dept: HEALTH INFORMATION MANAGEMENT | Facility: OTHER | Age: 84
End: 2019-02-19

## 2019-02-19 ENCOUNTER — RESULTS ONLY (OUTPATIENT)
Dept: LAB | Age: 84
End: 2019-02-19

## 2019-02-19 LAB
CREAT SERPL-MCNC: 0.64 MG/DL (ref 0.6–1.2)
GFR SERPL CREATININE-BSD FRML MDRD: 87 ML/MIN/{1.73_M2}

## 2019-02-19 NOTE — TELEPHONE ENCOUNTER
Notified [facility] that labs were ordered.  Dana Ortega ....................  2/19/2019   8:01 AM

## 2019-03-21 ENCOUNTER — NURSING HOME VISIT (OUTPATIENT)
Dept: GERIATRICS | Facility: OTHER | Age: 84
End: 2019-03-21
Attending: FAMILY MEDICINE
Payer: COMMERCIAL

## 2019-03-21 VITALS
OXYGEN SATURATION: 96 % | DIASTOLIC BLOOD PRESSURE: 69 MMHG | BODY MASS INDEX: 26.39 KG/M2 | WEIGHT: 143.4 LBS | SYSTOLIC BLOOD PRESSURE: 113 MMHG | HEART RATE: 86 BPM

## 2019-03-21 DIAGNOSIS — F02.80 DEMENTIA IN ALZHEIMER'S DISEASE (H): ICD-10-CM

## 2019-03-21 DIAGNOSIS — Z78.9 NURSING HOME RESIDENT: Primary | ICD-10-CM

## 2019-03-21 DIAGNOSIS — G30.9 DEMENTIA IN ALZHEIMER'S DISEASE (H): ICD-10-CM

## 2019-03-21 DIAGNOSIS — I10 ESSENTIAL HYPERTENSION: ICD-10-CM

## 2019-03-21 DIAGNOSIS — Z66 DNR (DO NOT RESUSCITATE): ICD-10-CM

## 2019-03-21 PROCEDURE — 99308 SBSQ NF CARE LOW MDM 20: CPT | Performed by: FAMILY MEDICINE

## 2019-03-21 NOTE — PROGRESS NOTES
60-day MD review-[facility]  SUBJECTIVE:   Tia Garrett is a 89 year old female who is seen in the common area at [facility] today.  She is doing well.  She did have a slight respiratory illness in the last month and tested negative for influenza but has been treated with both Augmentin short course and Tamiflu.  She still has a little bit of a stuffy nose.     Staff notes some decline in function with increasing dementia and slight decrease in intake although her weight has been fairly stable.         HPI    Patient Active Problem List   Diagnosis     Essential hypertension     Hiatal hernia     GERD (gastroesophageal reflux disease)     ACP (advance care planning)     Dementia without behavioral disturbance, unspecified dementia type     Dementia in Alzheimer's disease     DNR (do not resuscitate)     Hematuria     Nursing home resident     Wears hearing aid     Primary osteoarthritis of right knee     Sebaceous cyst     Past Surgical History:   Procedure Laterality Date     OTHER SURGICAL HISTORY      1943,SUR38,APPENDECTOMY OPEN     OTHER SURGICAL HISTORY      SHX83,TOTAL ABDOMINAL HYSTERECTOMY W/ BILATERAL SALPINGOOPHORECTOMY     OTHER SURGICAL HISTORY      07/2015,[id number],HIP FRACTURE TX,Right       Social History     Tobacco Use     Smoking status: Never Smoker     Smokeless tobacco: Never Used   Substance Use Topics     Alcohol use: No     No family history on file.      Current Outpatient Medications   Medication Sig Dispense Refill     Acetaminophen 325 MG CAPS Take 650 mg by mouth every 4 hours as needed 100 capsule 0     Acetaminophen 325 MG CAPS Take 650 mg by mouth 4 times daily And 650 mg q 24 hours PRN pain. 100 capsule      ammonium lactate (LAC-HYDRIN) 12 % lotion Apply topically 2 times daily       Menthol, Topical Analgesic, (BIOFREEZE) 4 % GEL        traMADol (ULTRAM) 50 MG tablet Take 0.5 tablets (25 mg) by mouth every 6 hours as needed for severe pain 10 tablet 0     No Known  Allergies    Review of Systems   Unable to perform ROS: Dementia        OBJECTIVE:     /69   Pulse 86   Wt 65 kg (143 lb 6.4 oz)   SpO2 96%   BMI 26.39 kg/m    Body mass index is 26.39 kg/m .  Physical Exam   Constitutional: She appears well-developed and well-nourished.   Cardiovascular: Normal rate and regular rhythm.   Neurological:   Confused, nonfluent speech.   Skin: Skin is warm and dry.   Psychiatric: She has a normal mood and affect.   Nursing note and vitals reviewed.      Diagnostic Test Results:  none     ASSESSMENT/PLAN:         ICD-10-CM    1. Nursing home resident Z59.3    2. Dementia in Alzheimer's disease G30.9     F02.80    3. Essential hypertension I10    4. DNR (do not resuscitate) Z66      Plan:  None of care reviewed and no changes made.  Continue current medication regimen.  Follow up 60 days.  No labs indicated.     Marisel Vela MD  [facility]    Portions of this dictation were created using the Dragon Nuance voice recognition system. Proofreading was completed but there may be errors in text.

## 2019-05-16 ENCOUNTER — TRANSFERRED RECORDS (OUTPATIENT)
Dept: HEALTH INFORMATION MANAGEMENT | Facility: OTHER | Age: 84
End: 2019-05-16

## 2019-05-16 ENCOUNTER — NURSING HOME VISIT (OUTPATIENT)
Dept: GERIATRICS | Facility: OTHER | Age: 84
End: 2019-05-16
Attending: NURSE PRACTITIONER
Payer: COMMERCIAL

## 2019-05-16 DIAGNOSIS — M17.11 PRIMARY OSTEOARTHRITIS OF RIGHT KNEE: ICD-10-CM

## 2019-05-16 DIAGNOSIS — F03.90 DEMENTIA WITHOUT BEHAVIORAL DISTURBANCE, UNSPECIFIED DEMENTIA TYPE: Primary | ICD-10-CM

## 2019-05-16 DIAGNOSIS — I10 ESSENTIAL HYPERTENSION: ICD-10-CM

## 2019-05-16 DIAGNOSIS — K21.9 GASTROESOPHAGEAL REFLUX DISEASE WITHOUT ESOPHAGITIS: ICD-10-CM

## 2019-05-16 PROCEDURE — 99309 SBSQ NF CARE MODERATE MDM 30: CPT | Performed by: NURSE PRACTITIONER

## 2019-05-28 ENCOUNTER — HOSPITAL ENCOUNTER (EMERGENCY)
Facility: OTHER | Age: 84
Discharge: NURSING, REHAB OR LONG-TERM CARE | End: 2019-05-29
Attending: FAMILY MEDICINE | Admitting: FAMILY MEDICINE
Payer: COMMERCIAL

## 2019-05-28 ENCOUNTER — APPOINTMENT (OUTPATIENT)
Dept: GENERAL RADIOLOGY | Facility: OTHER | Age: 84
End: 2019-05-28
Attending: FAMILY MEDICINE
Payer: COMMERCIAL

## 2019-05-28 VITALS
WEIGHT: 150 LBS | RESPIRATION RATE: 16 BRPM | TEMPERATURE: 99.3 F | BODY MASS INDEX: 27.6 KG/M2 | DIASTOLIC BLOOD PRESSURE: 112 MMHG | SYSTOLIC BLOOD PRESSURE: 179 MMHG | OXYGEN SATURATION: 94 %
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
## 2019-05-28 DIAGNOSIS — S70.01XA CONTUSION OF RIGHT HIP, INITIAL ENCOUNTER: ICD-10-CM

## 2019-05-28 PROCEDURE — 99284 EMERGENCY DEPT VISIT MOD MDM: CPT | Mod: 25 | Performed by: FAMILY MEDICINE

## 2019-05-28 PROCEDURE — 99283 EMERGENCY DEPT VISIT LOW MDM: CPT | Mod: Z6 | Performed by: FAMILY MEDICINE

## 2019-05-28 PROCEDURE — 73560 X-RAY EXAM OF KNEE 1 OR 2: CPT | Mod: RT

## 2019-05-28 PROCEDURE — 73502 X-RAY EXAM HIP UNI 2-3 VIEWS: CPT | Mod: TC

## 2019-05-28 RX ORDER — AMOXICILLIN 250 MG
1 CAPSULE ORAL PRN
COMMUNITY

## 2019-05-28 NOTE — PROGRESS NOTES
[facility]   [facility]  60 DAY RECERTIFICATION    Patient Name: Tia Garrett  YOB: 1927 Age: 89 year old  Medical RecordNumber: [id number]  Primary Provider: Dr. Vela    Advanced Directives: DNR/limited interventions    Date admitted to [facility]: 8/2017      HPI: Patient is seen today for 60 day evaluation for the following medical issues:  Dementia without behavioral disturbance, GERD, hypertension, osteoarthritis of knee.   Pt is pleasant and quite spunky today and talkative. Pain is mostly in right knee and is controlled with tylenol and tramadol along with biofreeze. Sleeping well at night. Pleasantly confused at times. Wheelchair dependent. Enjoys sitting in common area by TV. Eating and drinking well.  No heartburn symptoms.       ALLERGIES:  No Known Allergies    Past Medical History:    has no past medical history on file.    Past Surgical History:   has a past surgical history that includes other surgical history; other surgical history; and other surgical history.    Family History:  family history is not on file.    Social History:   reports that she has never smoked. She has never used smokeless tobacco. She reports that she does not drink alcohol.    Immunizations:   Immunization History   Administered Date(s) Administered     Influenza (IIV3) PF 12/14/2012     Pneumo Conj 13-V (2010&after) 05/31/2016     TDAP Vaccine (Boostrix) 05/31/2016       Current Medications:   Current Outpatient Medications   Medication     Acetaminophen 325 MG CAPS     Acetaminophen 325 MG CAPS     ammonium lactate (LAC-HYDRIN) 12 % lotion     Menthol, Topical Analgesic, (BIOFREEZE) 4 % GEL     traMADol (ULTRAM) 50 MG tablet     No current facility-administered medications for this visit.      Medication reconciliation completed between [facility] record and NH MAR.     Diagnostics/Labs (reviewed today):  Creatinine   Date Value Ref Range Status   02/19/2019 0.64 0.60 - 1.20 mg/dL Final  "          Current Diet: Mech soft    Review Of Systems:    Limited ROS due to dementia. See HPI.       Vital Signs: (obtained from nursing home record)  Temp 96.8   Pulse 82   Resp 20   /81   O2 Sats 97%   Weight 147.4 #     Objective:     Pleasant and alert without distress. She says \"I'm doing well.\" Talkative today and smiley.    Sclera nonicteric, conjunctiva non-inflamed.  Skin color pink.   Neck supple and without adenopathy   Lungs clear  No wheezes or rales noted.   Cardiovascular regular  Abdomen soft and without tenderness   Extremities with mild dependent edema.   Able to move upper and lower extremities       Assessment and Plan:  (F03.90) Dementia without behavioral disturbance, unspecified dementia type  (primary encounter diagnosis)  Comment: progressive  Plan: comfort measures, supportive cares    (K21.9) Gastroesophageal reflux disease without esophagitis  Comment: stable  Plan: No medications for this at this time.     (I10) Essential hypertension  Comment: stable  Plan: Currently taking no medications for this.     (M17.11) Primary osteoarthritis of right knee  Comment: chronic  Plan: controlled with tylenol and ultram and biofreeze.         Multidisciplinary notes, laboratory values, medications, vital signs, weight and orders are reviewed from nursing home records. I have reviewed the patient s medical history and updated the computerized patient record.     A total of 30 minutes was spent with this patient, of which more than 50% was spent in counseling and/or coordination of care.   CRESENCIO Romero, CNP  5/28/2019 1:42 PM        "

## 2019-05-28 NOTE — ED AVS SNAPSHOT
[facility]  [address]  [place] [address]  Phone:  [phone]  Fax:  [phone]                                    Tia Garrett   MRN: [id number]    Department:  [facility]   Date of Visit:  5/28/2019           After Visit Summary Signature Page    I have received my discharge instructions, and my questions have been answered. I have discussed any challenges I see with this plan with the nurse or doctor.    ..........................................................................................................................................  Patient/Patient Representative Signature      ..........................................................................................................................................  Patient Representative Print Name and Relationship to Patient    ..................................................               ................................................  Date                                   Time    ..........................................................................................................................................  Reviewed by Signature/Title    ...................................................              ..............................................  Date                                               Time          22EPIC Rev 08/18

## 2019-05-29 PROCEDURE — 25000132 ZZH RX MED GY IP 250 OP 250 PS 637: Performed by: FAMILY MEDICINE

## 2019-05-29 RX ORDER — HYDROCODONE BITARTRATE AND ACETAMINOPHEN 5; 325 MG/1; MG/1
1 TABLET ORAL EVERY 6 HOURS PRN
Qty: 18 TABLET | Refills: 0 | Status: SHIPPED | OUTPATIENT
Start: 2019-05-29 | End: 2019-08-09

## 2019-05-29 RX ORDER — HYDROCODONE BITARTRATE AND ACETAMINOPHEN 5; 325 MG/1; MG/1
1 TABLET ORAL ONCE
Status: COMPLETED | OUTPATIENT
Start: 2019-05-29 | End: 2019-05-29

## 2019-05-29 RX ADMIN — HYDROCODONE BITARTRATE AND ACETAMINOPHEN 1 TABLET: 5; 325 TABLET ORAL at 00:47

## 2019-05-29 ASSESSMENT — ENCOUNTER SYMPTOMS: COLOR CHANGE: 0

## 2019-05-29 NOTE — ED TRIAGE NOTES
Pt arrives via private car with family. From [facility]. Reported that pt fell at 1630, she is c/o R hip pain. Pt is WC bound per daughter in laws report, for the past 6 months. Pt has dementia and is a poor historian.

## 2019-05-29 NOTE — ED PROVIDER NOTES
History     Chief Complaint   Patient presents with     Hip Pain     Fall     HPI  Tiaeffie Garrett is a 89 year old female who comes to the emergency department with the daughter in law, pain seems to be localized to the right hip and right leg.  Fall earlier today.  Initially did not complain of much pain but complaints increased throughout the afternoon.  Patient has severe dementia, is verbal but nonambulatory.  Reviewed nurse's notes below, similar history is related to me.  [facility] Reported that pt fell at 1630, she is c/o R hip pain. Pt is WC bound per daughter in laws report, for the past 6 months. Pt has dementia and is a poor historian.      Allergies:  No Known Allergies    Problem List:    Patient Active Problem List    Diagnosis Date Noted     Sebaceous cyst 11/14/2018     Priority: Medium     Primary osteoarthritis of right knee 08/01/2018     Priority: Medium     Dementia in Alzheimer's disease 09/12/2017     Priority: Medium     DNR (do not resuscitate) 09/12/2017     Priority: Medium     Nursing home resident 09/12/2017     Priority: Medium     [facility]       Wears hearing aid 09/12/2017     Priority: Medium     Dementia without behavioral disturbance, unspecified dementia type 02/02/2017     Priority: Medium     Hematuria 11/28/2016     Priority: Medium     Overview:   Negative urology evaluation 2016.        ACP (advance care planning) 05/31/2016     Priority: Medium     Advance Care Planning 10/28/2016: Receipt of ACP document:  Received: invalid HCD document dated 9-14-10.  Document not previously scanned. Validation form completed indicating invalid document. Copy sent to client with information and facilitation resources. Validation form sent to be scanned as notation of invalid document received.  Code Status reflects choices in most recent ACP document.  Confirmed/documented designated decision maker(s).  Added by  Roula Dewey RN Advance Care Planning Liaison with Charlene Mcconnell  Advance Care Planning 5/31/2016: ACP Review of Chart / Resources Provided:  Reviewed chart for advance care plan.  Tia Garrett has no plan or code status on file however states presence of ACP document. Copy requested. Confirmed code status reflects current choices pending receipt of document/advance care plan review.  Confirmed/documented legally designated decision makers.  Added by Latia Lopez             Essential hypertension 05/31/2013     Priority: Medium     Hiatal hernia 05/31/2013     Priority: Medium     GERD (gastroesophageal reflux disease) 05/31/2013     Priority: Medium        Past Medical History:    No past medical history on file.    Past Surgical History:    Past Surgical History:   Procedure Laterality Date     OTHER SURGICAL HISTORY      1943,SUR38,APPENDECTOMY OPEN     OTHER SURGICAL HISTORY      SHX83,TOTAL ABDOMINAL HYSTERECTOMY W/ BILATERAL SALPINGOOPHORECTOMY     OTHER SURGICAL HISTORY      07/2015,[id number],HIP FRACTURE TX,Right       Family History:    No family history on file.    Social History:  Marital Status:   [5]  Social History     Tobacco Use     Smoking status: Never Smoker     Smokeless tobacco: Never Used   Substance Use Topics     Alcohol use: No     Drug use: Unknown     Types: Other     Comment: Drug use: No        Medications:      Acetaminophen 325 MG CAPS   Acetaminophen 325 MG CAPS   ammonium lactate (LAC-HYDRIN) 12 % lotion   Menthol, Topical Analgesic, (BIOFREEZE) 4 % GEL   senna-docusate (SENOKOT-S/PERICOLACE) 8.6-50 MG tablet   traMADol (ULTRAM) 50 MG tablet         Review of Systems   Unable to perform ROS: Dementia   Skin: Negative for color change.       Physical Exam   BP: (!) 179/112  Heart Rate: 93  Temp: 99.3  F (37.4  C)  Resp: 16  Weight: 68 kg (150 lb)  SpO2: 94 %      Physical Exam   Constitutional: She is oriented to person, place, and time.   Cardiovascular: Normal  rate.   Pulmonary/Chest: Effort normal. She exhibits no tenderness.   Musculoskeletal: She exhibits tenderness.   Neurological: She is alert and oriented to person, place, and time.   Nursing note and vitals reviewed.    Mild diffuse tenderness of the right hip, no specific bony point tenderness.  ED Course        Procedures       No results found for this or any previous visit (from the past 24 hour(s)).    Medications - No data to display    Assessments & Plan (with Medical Decision Making)     Right hardware is in place, no obvious fracture.  Await radiology overread.  Patient is nonambulatory anyway so I feel discharging patient back to [facility] without a radiology read at this point is acceptable.  I have low clinical suspicion for occult fracture at this time.  Pain control in the emergency department and a prescription for Norco at home.  Daughter verbalized understanding plan is in agreement.     Medication List      There are no discharge medications for this visit.         Final diagnoses:   Fall, initial encounter   Contusion of right hip, initial encounter       5/28/2019   [facility]     Benitez Townsedn MD  05/29/19 0115

## 2019-06-12 ENCOUNTER — TELEPHONE (OUTPATIENT)
Dept: FAMILY MEDICINE | Facility: OTHER | Age: 84
End: 2019-06-12

## 2019-06-12 DIAGNOSIS — F02.818 LATE ONSET ALZHEIMER'S DISEASE WITH BEHAVIORAL DISTURBANCE (H): Primary | ICD-10-CM

## 2019-06-12 DIAGNOSIS — G30.1 LATE ONSET ALZHEIMER'S DISEASE WITH BEHAVIORAL DISTURBANCE (H): Primary | ICD-10-CM

## 2019-06-12 RX ORDER — RISPERIDONE 0.5 MG/1
TABLET ORAL
Qty: 30 TABLET | Refills: 11 | Status: SHIPPED | OUTPATIENT
Start: 2019-06-12 | End: 2019-10-03

## 2019-06-12 NOTE — TELEPHONE ENCOUNTER
Called daughter in law Asmita and discussed behavioral concerns of nursing staff at .  She knows NOTHING about this or that she had appointment. The only way she knew about appointment was that [facility] had called.  Family did call and discuss with afternoon staff and are not opposed to trying a medication. Risks including stroke discussed and would like to precede.(spoke to Asmita Garrett).  Marisel Vela MD  3:24 PM 6/12/2019

## 2019-06-12 NOTE — TELEPHONE ENCOUNTER
Spoke with Kate, nurse manager at [facility] and she states that patient is screaming, hitting, happy one minute and the next crying, removing clothing, and also talking in tongue. These behaviors have increase in the past 2 weeks. Could a medication be added to help with this?  Kate's number (nurse manager)is [phone]  Dana Ortega ....................  6/12/2019   9:40 AM

## 2019-07-16 ENCOUNTER — RESULTS ONLY (OUTPATIENT)
Dept: LAB | Age: 84
End: 2019-07-16

## 2019-07-16 ENCOUNTER — MEDICAL CORRESPONDENCE (OUTPATIENT)
Dept: LAB | Facility: OTHER | Age: 84
End: 2019-07-16

## 2019-07-16 LAB
ALBUMIN UR-MCNC: 30 MG/DL
APPEARANCE UR: ABNORMAL
BACTERIA #/AREA URNS HPF: ABNORMAL /HPF
BILIRUB UR QL STRIP: NEGATIVE
COLOR UR AUTO: YELLOW
GLUCOSE UR STRIP-MCNC: NEGATIVE MG/DL
HGB UR QL STRIP: ABNORMAL
KETONES UR STRIP-MCNC: ABNORMAL MG/DL
LEUKOCYTE ESTERASE UR QL STRIP: ABNORMAL
NITRATE UR QL: POSITIVE
PH UR STRIP: 5 PH (ref 5–9)
RBC #/AREA URNS AUTO: ABNORMAL /HPF
SOURCE: ABNORMAL
SP GR UR STRIP: 1.02 (ref 1–1.03)
UROBILINOGEN UR STRIP-ACNC: 0.2 EU/DL (ref 0.2–1)
WBC #/AREA URNS AUTO: >100 /HPF

## 2019-07-18 LAB
BACTERIA SPEC CULT: ABNORMAL
SPECIMEN SOURCE: ABNORMAL

## 2019-07-25 ENCOUNTER — NURSING HOME VISIT (OUTPATIENT)
Dept: GERIATRICS | Facility: OTHER | Age: 84
End: 2019-07-25
Attending: FAMILY MEDICINE
Payer: COMMERCIAL

## 2019-07-25 DIAGNOSIS — G30.9 DEMENTIA IN ALZHEIMER'S DISEASE (H): ICD-10-CM

## 2019-07-25 DIAGNOSIS — Z66 DNR (DO NOT RESUSCITATE): ICD-10-CM

## 2019-07-25 DIAGNOSIS — F02.80 DEMENTIA IN ALZHEIMER'S DISEASE (H): ICD-10-CM

## 2019-07-25 DIAGNOSIS — Z78.9 NURSING HOME RESIDENT: Primary | ICD-10-CM

## 2019-07-25 DIAGNOSIS — F03.90 DEMENTIA WITHOUT BEHAVIORAL DISTURBANCE, UNSPECIFIED DEMENTIA TYPE: ICD-10-CM

## 2019-07-25 PROCEDURE — 99304 1ST NF CARE SF/LOW MDM 25: CPT | Performed by: FAMILY MEDICINE

## 2019-07-25 NOTE — PROGRESS NOTES
60 Day MD Visit-[facility]        HPI:    Tia Garrett is a 89 year old  (11/24/1927), who is being seen today for a 60 day MD review. She has been experiencing increased behavioral difficulties at times. Yells, hits, talks gibberish. Today she is calm.   Currently on low dose risperidone with family aware.   Recent UTI and on medication.       ICD-10-CM    1. Nursing home resident Z59.3    2. Dementia without behavioral disturbance, unspecified dementia type F03.90    3. Dementia in Alzheimer's disease G30.9     F02.80    4. DNR (do not resuscitate) Z66      Social History     Social History Narrative    Moved to [facility] 8/3/2017. Had lived with her son, Dada,  and his wife in [place] for 15 years.  Daughter in law(Asmita Garrett) had worked at [facility] [employer] until 2016.   1991.   Has 4 children, 1 daughter and 3 sons. 1 son in [place], 1 in [facility] [place] and 1 in [place], daughter in [place].  12 grand children and many greats.       Past medical history, past surgical history, social history, allergies and medication list  are reviewed today.    Information reviewed:  Medications, vital signs, orders, and nursing notes.    ROS:  Review of Systems   Unable to perform ROS: Dementia       Exam:  Vitals: There were no vitals taken for this visit.  BMI= There is no height or weight on file to calculate BMI.  Physical Exam   Constitutional: She appears well-developed and well-nourished. No distress.   Seen in dining area this am.   Has eaten all of her meal, calm and interactive.    Nursing note and vitals reviewed.      Lab/Diagnostic data:     CBC RESULTS:   Recent Labs   Lab Test 07/21/17  1028 04/29/17  1029 02/02/17  1032   WBC  --  6.7 5.5   RBC  --  4.49 4.04   HGB 12.6 13.6 12.2   HCT 36.5 40.7 37.0   MCV 92 91 92   MCH 31.6 30.3 30.2   MCHC 34.5 33.4 33.0   RDW 12.7 12.9 13.3    241 185       Last Basic Metabolic Panel:  Recent Labs   Lab Test 02/19/19  1100 07/21/17  1042 04/29/17  1029   NA   --  140 139   POTASSIUM  --  3.5 3.5   CHLORIDE  --  106 102   TAMMY  --  9.1 8.7   CO2  --  24 27   BUN  --  16 20   CR 0.64 0.65* 0.80   GLC  --  98 109*       Liver Function Studies -   Recent Labs   Lab Test 07/21/17  1042 04/29/17  1029 02/02/17  1032   PROTTOTAL 6.6 7.9 7.2   ALBUMIN 3.9 4.3 3.7   BILITOTAL 0.5 0.6 0.5   ALKPHOS 68 86 78   AST  --  18 18   ALT  --  16 19         No results found for: A1C    ASSESSMENT:    ICD-10-CM    1. Nursing home resident Z59.3    2. Dementia without behavioral disturbance, unspecified dementia type F03.90    3. Dementia in Alzheimer's disease G30.9     F02.80    4. DNR (do not resuscitate) Z66        PLAN:  No changes made in care plan.  Continue to assess behaviors.   Recent UTI noted and finishing antibiotic.     Marisel Vela 7/25/2019 9:38 AM  Portions of this dictation were created using the Dragon Nuance voice recognition system. Proofreading was completed but there may be errors in text.

## 2019-07-30 ENCOUNTER — NURSING HOME VISIT (OUTPATIENT)
Dept: GERIATRICS | Facility: OTHER | Age: 84
End: 2019-07-30
Attending: NURSE PRACTITIONER
Payer: COMMERCIAL

## 2019-07-30 DIAGNOSIS — F51.05 INSOMNIA DUE TO OTHER MENTAL DISORDER: ICD-10-CM

## 2019-07-30 DIAGNOSIS — F99 INSOMNIA DUE TO OTHER MENTAL DISORDER: ICD-10-CM

## 2019-07-30 DIAGNOSIS — F03.91 DEMENTIA WITH BEHAVIORAL DISTURBANCE, UNSPECIFIED DEMENTIA TYPE: Primary | ICD-10-CM

## 2019-07-30 PROCEDURE — 99309 SBSQ NF CARE MODERATE MDM 30: CPT | Performed by: NURSE PRACTITIONER

## 2019-08-01 PROBLEM — F51.05 INSOMNIA DUE TO OTHER MENTAL DISORDER: Status: ACTIVE | Noted: 2019-08-01

## 2019-08-01 PROBLEM — L72.3 SEBACEOUS CYST: Status: RESOLVED | Noted: 2018-11-14 | Resolved: 2019-08-01

## 2019-08-01 PROBLEM — F03.91 DEMENTIA WITH BEHAVIORAL DISTURBANCE, UNSPECIFIED DEMENTIA TYPE: Status: ACTIVE | Noted: 2017-02-02

## 2019-08-01 PROBLEM — F99 INSOMNIA DUE TO OTHER MENTAL DISORDER: Status: ACTIVE | Noted: 2019-08-01

## 2019-08-01 NOTE — PROGRESS NOTES
[facility]  Acute Care Visit    Patient Name: Tia Garrett   : 1927  MRN: [id number]    Place of Service: [facility]  DOS: 2019    CC: dementia sundowning, insomnia    HPI:  Tia Garrett is a 89 year old female with PMH of multiple chronic medical concerns, who is seen today regarding family care conference done with nurse managers and per my recommendations and pt's behaviors with agitation, restlessness, insomnia and sundowning behaviors and no improvement with risperdal, will stop risperdal and start celexa and melatonin.     Pt was recently treated for UTI as well. She is more talkative. Eating and drinking ok. Occasionally refuses or spits out her medications.   Per nursing staff she is transferring much better and requesting a PT eval to not use EZ stand anymore and transfer with 1 or 2 staff as appropriately deemed safe by PT.     Multidisciplinary notes, laboratory values, medications, vital signs, weight and orders arereviewed from nursing home records. I have reviewed the patient s medical history and updated the computerized patient record.     PMH:  No past medical history on file.    Medications:  Current Outpatient Medications   Medication Sig Dispense Refill     Acetaminophen 325 MG CAPS Take 650 mg by mouth every 4 hours as needed 100 capsule 0     Acetaminophen 325 MG CAPS Take 650 mg by mouth 4 times daily And 650 mg q 24 hours PRN pain. 100 capsule      ammonium lactate (LAC-HYDRIN) 12 % lotion Apply topically 2 times daily       Menthol, Topical Analgesic, (BIOFREEZE) 4 % GEL        risperiDONE (RISPERDAL) 0.5 MG tablet Take one tablet by mouth daily at bedtime or pm dosing 30 tablet 11     senna-docusate (SENOKOT-S/PERICOLACE) 8.6-50 MG tablet Take 2 tablets by mouth 2 times daily       traMADol (ULTRAM) 50 MG tablet Take 0.5 tablets (25 mg) by mouth every 6 hours as needed for severe pain 10 tablet 0     Medication reconciliation complete between Epic  record and NH MAR.     Allergies:  No Known Allergies    Review of Systems:  Limited ROS due to dementia. See HPI.       Vital Signs:  Temp 98.2   Pulse 68   Respirations 16   /99   Oxygen Sats 95%   Weight 154.4#    Physical Exam:     Pleasant and alert without distress. Clear speech at times but nonsensical most of the time.    Sclera nonicteric, conjunctiva non-inflamed.  Skin color pink. Mucous membranes moist.   Neck supple and without adenopathy   Lungs clear   Cardiovascular regular        New Labs/Diagnostics:  Last Comprehensive Metabolic Panel:  Sodium   Date Value Ref Range Status   07/21/2017 140 133 - 143 mmol/L      Potassium   Date Value Ref Range Status   07/21/2017 3.5 3.5 - 5.1 mmol/L      Chloride   Date Value Ref Range Status   07/21/2017 106 98 - 107 mmol/L      Carbon Dioxide   Date Value Ref Range Status   07/21/2017 24 21 - 31 mmol/L      Anion Gap   Date Value Ref Range Status   04/29/2017 10 3 - 14 mmol/L Final     Glucose   Date Value Ref Range Status   07/21/2017 98 70 - 105 mg/dL      Urea Nitrogen   Date Value Ref Range Status   07/21/2017 16 7 - 25 mg/dL      Creatinine   Date Value Ref Range Status   02/19/2019 0.64 0.60 - 1.20 mg/dL Final     GFR Estimate   Date Value Ref Range Status   02/19/2019 87 >60 mL/min/[1.73_m2] Final     Calcium   Date Value Ref Range Status   07/21/2017 9.1 8.6 - 10.3 mg/dL      Lab Results   Component Value Date    AST 18 04/29/2017     Lab Results   Component Value Date    ALT 16 04/29/2017     No results found for: BILICONJ   Lab Results   Component Value Date    BILITOTAL 0.5 07/21/2017     Lab Results   Component Value Date    ALBUMIN 3.9 07/21/2017     Lab Results   Component Value Date    PROTTOTAL 6.6 07/21/2017      Lab Results   Component Value Date    ALKPHOS 68 07/21/2017     Lab Results   Component Value Date    WBC 6.7 04/29/2017     Lab Results   Component Value Date    RBC 4.49 04/29/2017     Lab Results   Component Value Date    HGB  12.6 07/21/2017     Lab Results   Component Value Date    HCT 36.5 07/21/2017     No components found for: MCT  Lab Results   Component Value Date    MCV 92 07/21/2017     Lab Results   Component Value Date    MCH 31.6 07/21/2017     Lab Results   Component Value Date    MCHC 34.5 07/21/2017     Lab Results   Component Value Date    RDW 12.7 07/21/2017     Lab Results   Component Value Date     07/21/2017         Assessment/Plan:  (F03.91) Dementia with behavioral disturbance, unspecified dementia type  (primary encounter diagnosis)  Comment: sundowning and more agitation, anger at times  Plan: Stop risperdal as it did not help behaviors.   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         A total of 30 minutes was spent with this patient, of which more than 50% was spent in counseling and/or coordination of care.     CRESENCIO Romero, CNP ....................  8/1/2019   12:11 PM

## 2019-08-09 ENCOUNTER — TELEPHONE (OUTPATIENT)
Dept: INTERNAL MEDICINE | Facility: OTHER | Age: 84
End: 2019-08-09

## 2019-08-09 DIAGNOSIS — G89.29 CHRONIC PAIN OF RIGHT KNEE: Primary | ICD-10-CM

## 2019-08-09 DIAGNOSIS — M25.561 CHRONIC PAIN OF RIGHT KNEE: Primary | ICD-10-CM

## 2019-08-09 RX ORDER — TRAMADOL HYDROCHLORIDE 50 MG/1
25 TABLET ORAL EVERY 6 HOURS PRN
Qty: 10 TABLET | Refills: 0 | Status: SHIPPED | OUTPATIENT
Start: 2019-08-09 | End: 2020-01-01

## 2019-08-09 RX ORDER — HYDROCODONE BITARTRATE AND ACETAMINOPHEN 5; 325 MG/1; MG/1
1 TABLET ORAL EVERY 6 HOURS PRN
Qty: 18 TABLET | Refills: 0 | Status: SHIPPED | OUTPATIENT
Start: 2019-08-09 | End: 2020-03-05

## 2019-08-09 NOTE — TELEPHONE ENCOUNTER
After verifying pts name and date of birth with Kate, notified Kate Rx were faxed to the pharmacy at this time. Pharmacy also notified orders being faxed over.  Paula Nettles

## 2019-08-09 NOTE — TELEPHONE ENCOUNTER
Needing hard script for hydrocodone and tramadol sent to pharmacy today. Pt will be out this weekend

## 2019-08-21 ENCOUNTER — TRANSFERRED RECORDS (OUTPATIENT)
Dept: HEALTH INFORMATION MANAGEMENT | Facility: OTHER | Age: 84
End: 2019-08-21

## 2019-09-19 ENCOUNTER — NURSING HOME VISIT (OUTPATIENT)
Dept: GERIATRICS | Facility: OTHER | Age: 84
End: 2019-09-19
Attending: NURSE PRACTITIONER
Payer: COMMERCIAL

## 2019-09-19 DIAGNOSIS — F03.91 DEMENTIA WITH BEHAVIORAL DISTURBANCE, UNSPECIFIED DEMENTIA TYPE: Primary | ICD-10-CM

## 2019-09-19 DIAGNOSIS — K21.9 GASTROESOPHAGEAL REFLUX DISEASE WITHOUT ESOPHAGITIS: ICD-10-CM

## 2019-09-19 DIAGNOSIS — R05.9 COUGH: ICD-10-CM

## 2019-09-19 DIAGNOSIS — F51.05 INSOMNIA DUE TO OTHER MENTAL DISORDER: ICD-10-CM

## 2019-09-19 DIAGNOSIS — F99 INSOMNIA DUE TO OTHER MENTAL DISORDER: ICD-10-CM

## 2019-09-19 PROCEDURE — 99310 SBSQ NF CARE HIGH MDM 45: CPT | Performed by: NURSE PRACTITIONER

## 2019-10-03 RX ORDER — CITALOPRAM HYDROBROMIDE 10 MG/1
10 TABLET ORAL DAILY
COMMUNITY
Start: 2019-10-03

## 2019-10-03 NOTE — PROGRESS NOTES
[facility]  60 DAY RECERTIFICATION    Patient Name: Tia Garrett  YOB: 1927 Age: 89 year old  Medical RecordNumber: [id number]  Primary Provider: Dr. Constantino    Advanced Directives: DNR/DNI selective treatment    Date admitted to [facility]: 8/2017      HPI: Patient is seen today for 60 day evaluation for the following medical issues:  Dementia with behavioral disturbance, gerd, insomnia, and cough  Pt has been doing well. She has behaviors off and on at times, mostly during cares. She has been sleeping through the night.   At times will cry but this has become less frequent. This occurred once when she thought one of the nursing aides was her daughter and it made her sad.   Occasional pain in knees. Tramadol and norco for pain prn.   She seems most troubled by spitting up yellow/clear phlegm and spits it on the floor. It seems to be sinus congestion or possibly allergy related.   No fevers.     ALLERGIES:  No Known Allergies    Past Medical History:    has no past medical history on file.    Past Surgical History:   has a past surgical history that includes other surgical history; other surgical history; and other surgical history.    Family History:  family history is not on file.    Social History:   reports that she has never smoked. She has never used smokeless tobacco. She reports that she does not drink alcohol.    Immunizations:   Immunization History   Administered Date(s) Administered     Influenza (IIV3) PF 12/14/2012     Pneumo Conj 13-V (2010&after) 05/31/2016     TDAP Vaccine (Boostrix) 05/31/2016       Current Medications:   Current Outpatient Medications   Medication     Acetaminophen 325 MG CAPS     Acetaminophen 325 MG CAPS     ammonium lactate (LAC-HYDRIN) 12 % lotion     HYDROcodone-acetaminophen (NORCO) 5-325 MG tablet     Menthol, Topical Analgesic, (BIOFREEZE) 4 % GEL     risperiDONE (RISPERDAL) 0.5 MG tablet     senna-docusate (SENOKOT-S/PERICOLACE)  8.6-50 MG tablet     traMADol (ULTRAM) 50 MG tablet     No current facility-administered medications for this visit.      Medication reconciliation completed between [facility] record and NH MAR.       CURRENTLYENROLLED IN: Facility PT/restorative therapy    Current Diet: [facility] soft diet, thin liquids    Review Of Systems:    Limited ROS due to dementia. See HPI.   Denies pain     Vital Signs: (obtained from nursing home record)  Temp 98.0   Pulse 87   Resp 16   /98   O2 Sats 96%   Weight 150.8 #     Objective:     Pleasantly confusted and alert without distress.    Sclera nonicteric, conjunctiva non-inflamed.  Skin color pink.   Neck supple and without adenopathy   Lungs rhonchi, cleared after coughing  Cardiovascular regular  Abdomen soft and without tenderness   Extremities without edema.   Gait is unstable. Needs assist with 1-2 to transfer even though will self transfer at times  Able to move upper and lower extremities       Assessment and Plan:  (F03.91) Dementia with behavioral disturbance, unspecified dementia type (H)  (primary encounter diagnosis)  Comment: progressive  Plan: Pt is doing well with celexa and melatonin  (better than when she was taking risperdal).     (K21.9) Gastroesophageal reflux disease without esophagitis  Comment: stable  Plan: currently not needing medication for this. Has been on zantac in the past.     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 May consider trialing zantac.         Multidisciplinary notes, laboratory values, medications, vital signs, weight and orders are reviewed from nursing home records. I have reviewed the patient s medical history and updated the computerized patient record.     A total of 40 minutes was spent with this patient, of  which more than 50% was spent in counseling and/or coordination of care.   CRESENCIO Romero, CNP  10/3/2019 2:02 PM

## 2019-11-13 PROBLEM — F03.91 DEMENTIA WITH BEHAVIORAL DISTURBANCE, UNSPECIFIED DEMENTIA TYPE: Status: RESOLVED | Noted: 2017-02-02 | Resolved: 2019-11-13

## 2019-11-13 PROBLEM — G30.0: Status: ACTIVE | Noted: 2017-09-12

## 2019-11-13 PROBLEM — F02.818: Status: ACTIVE | Noted: 2017-09-12

## 2019-11-13 NOTE — PROGRESS NOTES
[facility]    No chief complaint on file.      HPI:    Tia Garrett is a 89 year old  (11/24/1927), who is being seen today for a federally mandated E/M visit at [facility].  HPI information obtained from: facility chart records, facility staff and [place] Epic chart review. Today's concerns are:    ICD-10-CM    1. Nursing home resident - Recertification - [facility] Z59.3    2. Benign essential hypertension I10    3. 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  Due for recertification.    She has dementia and Alzheimer's disease, advanced stages.  Needs frequent re-directing.  Daughter does not want her moved to memory care.  Nursing staff has been trying to take care of her best they can given that she needs frequent redirecting.    Hypertension, currently controlled.  No longer on medication.    Heartburn and reflux, appears stable.  She declines symptoms at this time.    Behavioral disturbance, apparently improved. + controlled at this time.  Recently started on citalopram.  Dose has been adjusted.  This has been quite helpful.      ALLERGIES: Patient has no known allergies.  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  Document not previously scanned. Validation form completed indicating invalid document. Copy sent to client with information and facilitation resources. Validation form sent to be scanned as notation of invalid document received.  Code Status reflects choices in most recent ACP document.  Confirmed/documented designated decision maker(s).  Added by Roula Dewey RN Advance Care Planning Liaison with Charlene Mcconnell  Advance Care Planning 5/31/2016: ACP Review of Chart / Resources Provided:  Reviewed chart for advance care plan.  Tia Garrett has no plan or code status on file however states presence of ACP document. Copy requested. Confirmed code status reflects current choices pending receipt of document/advance care plan review.  Confirmed/documented legally designated decision makers.  Added by Latia Lopez             Benign essential hypertension 05/31/2013     Priority: Medium     Hiatal hernia 05/31/2013     Priority: Medium     GERD (gastroesophageal reflux disease) 05/31/2013     Priority: Medium     PAST MEDICAL HISTORY: No past medical history on file.  PAST SURGICAL HISTORY:  has a past surgical history that includes other surgical history; other surgical history; and other surgical history.  FAMILY HISTORY: family history is not on file.  SOCIAL HISTORY:  reports that she has never smoked. She has never used smokeless tobacco. She reports that she does not drink alcohol.    MEDICATIONS: Medications reviewed from facility chart.  Current Outpatient Medications   Medication Sig Dispense Refill     Acetaminophen 325 MG CAPS Take 650 mg by mouth every 4 hours as needed 100 capsule 0     Acetaminophen 325 MG CAPS Take 650 mg by mouth 4 times daily And 650 mg q 24 hours PRN pain. 100 capsule      ammonium lactate (LAC-HYDRIN) 12 % lotion Apply topically 2 times daily       citalopram (CELEXA) 10 MG tablet Take 1 tablet (10 mg) by mouth daily       HYDROcodone-acetaminophen  (NORCO) 5-325 MG tablet Take 1 tablet by mouth every 6 hours as needed for pain 18 tablet 0     melatonin 1 MG TABS tablet Take 1 tablet (1 mg) by mouth At Bedtime       Menthol, Topical Analgesic, (BIOFREEZE) 4 % GEL        senna-docusate (SENOKOT-S/PERICOLACE) 8.6-50 MG tablet Take 2 tablets by mouth 2 times daily       traMADol (ULTRAM) 50 MG tablet Take 0.5 tablets (25 mg) by mouth every 6 hours as needed for severe pain 10 tablet 0       Case Management:  I have reviewed the care plan and MDS and do agree with the plan. Patient's desire to return to the community is not present.  Information reviewed:  Medications, vital signs, orders, and nursing notes.    ROS:  Review of Systems   Unable to perform ROS: Dementia       Exam:  Vitals: -- Vitals reviewed from nursing home.   There were no vitals taken for this visit.  BMI= There is no height or weight on file to calculate BMI.  Physical Exam  HENT:      Head: Normocephalic.   Eyes:      General: No scleral icterus.     Conjunctiva/sclera: Conjunctivae normal.   Cardiovascular:      Rate and Rhythm: Normal rate and regular rhythm.   Pulmonary:      Effort: Pulmonary effort is normal.      Breath sounds: No wheezing.   Abdominal:      Palpations: Abdomen is soft.      Tenderness: There is no abdominal tenderness.   Musculoskeletal:         General: No swelling.   Skin:     General: Skin is warm and dry.   Neurological:      Mental Status: She is alert. Mental status is at baseline.   Psychiatric:         Mood and Affect: Mood normal.         Lab/Diagnostic data:   CBC RESULTS:   Recent Labs   Lab Test 07/21/17  1028 04/29/17  1029 02/02/17  1032   WBC  --  6.7 5.5   RBC  --  4.49 4.04   HGB 12.6 13.6 12.2   HCT 36.5 40.7 37.0   MCV 92 91 92   MCH 31.6 30.3 30.2   MCHC 34.5 33.4 33.0   RDW 12.7 12.9 13.3    241 185       Last Basic Metabolic Panel:  Recent Labs   Lab Test 02/19/19  1100 07/21/17  1042 04/29/17  1029   NA  --  140 139   POTASSIUM  --  3.5 3.5    CHLORIDE  --  106 102   TAMMY  --  9.1 8.7   CO2  --  24 27   BUN  --  16 20   CR 0.64 0.65* 0.80   GLC  --  98 109*       Liver Function Studies -   Recent Labs   Lab Test 07/21/17  1042 04/29/17  1029 02/02/17  1032   PROTTOTAL 6.6 7.9 7.2   ALBUMIN 3.9 4.3 3.7   BILITOTAL 0.5 0.6 0.5   ALKPHOS 68 86 78   AST  --  18 18   ALT  --  16 19       No results found for: TSH]    No results found for: A1C    ASSESSMENT/PLAN  Problem List Items Addressed This Visit        Nervous and Auditory    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   Continue current medications and treatments.   May use standing orders.       Lino Kilgore MD  Internal Medicine  [facility]     Portions of this note were dictated using speech recognition software. The note has been proofread but errors in the text may have been overlooked. Please contact me if there are any concerns regarding the accuracy of the dictation.

## 2019-11-18 ENCOUNTER — NURSING HOME VISIT (OUTPATIENT)
Dept: GERIATRICS | Facility: OTHER | Age: 84
End: 2019-11-18
Attending: INTERNAL MEDICINE
Payer: COMMERCIAL

## 2019-11-18 DIAGNOSIS — G30.0 DEMENTIA IN ALZHEIMER'S DISEASE WITH EARLY ONSET WITH BEHAVIORAL DISTURBANCE (H): ICD-10-CM

## 2019-11-18 DIAGNOSIS — Z78.9 NURSING HOME RESIDENT: Primary | ICD-10-CM

## 2019-11-18 DIAGNOSIS — F02.818 DEMENTIA IN ALZHEIMER'S DISEASE WITH EARLY ONSET WITH BEHAVIORAL DISTURBANCE (H): ICD-10-CM

## 2019-11-18 DIAGNOSIS — I10 BENIGN ESSENTIAL HYPERTENSION: ICD-10-CM

## 2019-11-18 PROCEDURE — 99308 SBSQ NF CARE LOW MDM 20: CPT | Performed by: INTERNAL MEDICINE

## 2020-01-01 ENCOUNTER — VIRTUAL VISIT (OUTPATIENT)
Dept: FAMILY MEDICINE | Facility: OTHER | Age: 85
End: 2020-01-01
Attending: FAMILY MEDICINE
Payer: COMMERCIAL

## 2020-01-01 ENCOUNTER — MEDICAL CORRESPONDENCE (OUTPATIENT)
Dept: HEALTH INFORMATION MANAGEMENT | Facility: OTHER | Age: 85
End: 2020-01-01

## 2020-01-01 ENCOUNTER — NURSING HOME VISIT (OUTPATIENT)
Dept: GERIATRICS | Facility: OTHER | Age: 85
End: 2020-01-01
Attending: NURSE PRACTITIONER
Payer: COMMERCIAL

## 2020-01-01 ENCOUNTER — TELEPHONE (OUTPATIENT)
Dept: INTERNAL MEDICINE | Facility: OTHER | Age: 85
End: 2020-01-01

## 2020-01-01 ENCOUNTER — RESULTS ONLY (OUTPATIENT)
Dept: LAB | Age: 85
End: 2020-01-01

## 2020-01-01 ENCOUNTER — VIRTUAL VISIT (OUTPATIENT)
Dept: INTERNAL MEDICINE | Facility: OTHER | Age: 85
End: 2020-01-01
Attending: INTERNAL MEDICINE
Payer: COMMERCIAL

## 2020-01-01 ENCOUNTER — NURSE TRIAGE (OUTPATIENT)
Dept: INTERNAL MEDICINE | Facility: OTHER | Age: 85
End: 2020-01-01

## 2020-01-01 VITALS
DIASTOLIC BLOOD PRESSURE: 88 MMHG | RESPIRATION RATE: 17 BRPM | WEIGHT: 133 LBS | BODY MASS INDEX: 24.48 KG/M2 | HEART RATE: 80 BPM | SYSTOLIC BLOOD PRESSURE: 153 MMHG | TEMPERATURE: 97.5 F | OXYGEN SATURATION: 98 %

## 2020-01-01 VITALS
WEIGHT: 135.2 LBS | DIASTOLIC BLOOD PRESSURE: 90 MMHG | TEMPERATURE: 97.6 F | RESPIRATION RATE: 16 BRPM | OXYGEN SATURATION: 95 % | HEART RATE: 70 BPM | BODY MASS INDEX: 24.88 KG/M2 | SYSTOLIC BLOOD PRESSURE: 125 MMHG

## 2020-01-01 DIAGNOSIS — F02.818 DEMENTIA IN ALZHEIMER'S DISEASE WITH EARLY ONSET WITH BEHAVIORAL DISTURBANCE (H): Primary | ICD-10-CM

## 2020-01-01 DIAGNOSIS — I10 BENIGN ESSENTIAL HYPERTENSION: ICD-10-CM

## 2020-01-01 DIAGNOSIS — F02.818 DEMENTIA IN ALZHEIMER'S DISEASE WITH EARLY ONSET WITH BEHAVIORAL DISTURBANCE (H): ICD-10-CM

## 2020-01-01 DIAGNOSIS — N30.01 ACUTE CYSTITIS WITH HEMATURIA: Primary | ICD-10-CM

## 2020-01-01 DIAGNOSIS — Z78.9 NURSING HOME RESIDENT: Primary | ICD-10-CM

## 2020-01-01 DIAGNOSIS — F99 INSOMNIA DUE TO OTHER MENTAL DISORDER: ICD-10-CM

## 2020-01-01 DIAGNOSIS — R05.8 PRODUCTIVE COUGH: Primary | ICD-10-CM

## 2020-01-01 DIAGNOSIS — M25.561 CHRONIC PAIN OF RIGHT KNEE: ICD-10-CM

## 2020-01-01 DIAGNOSIS — Z97.4 WEARS HEARING AID: ICD-10-CM

## 2020-01-01 DIAGNOSIS — K44.9 HIATAL HERNIA: ICD-10-CM

## 2020-01-01 DIAGNOSIS — M17.11 PRIMARY OSTEOARTHRITIS OF RIGHT KNEE: ICD-10-CM

## 2020-01-01 DIAGNOSIS — F51.05 INSOMNIA DUE TO OTHER MENTAL DISORDER: ICD-10-CM

## 2020-01-01 DIAGNOSIS — G30.0 DEMENTIA IN ALZHEIMER'S DISEASE WITH EARLY ONSET WITH BEHAVIORAL DISTURBANCE (H): ICD-10-CM

## 2020-01-01 DIAGNOSIS — R50.9 FEVER AND CHILLS: Primary | ICD-10-CM

## 2020-01-01 DIAGNOSIS — G30.0 DEMENTIA IN ALZHEIMER'S DISEASE WITH EARLY ONSET WITH BEHAVIORAL DISTURBANCE (H): Primary | ICD-10-CM

## 2020-01-01 DIAGNOSIS — G89.29 CHRONIC PAIN OF RIGHT KNEE: ICD-10-CM

## 2020-01-01 DIAGNOSIS — Z66 DNR (DO NOT RESUSCITATE): ICD-10-CM

## 2020-01-01 LAB
ALBUMIN SERPL-MCNC: 3.8 G/DL (ref 3.5–5.7)
ALBUMIN UR-MCNC: 30 MG/DL
ALP SERPL-CCNC: 47 U/L (ref 34–104)
ALT SERPL W P-5'-P-CCNC: 9 U/L (ref 7–52)
ANION GAP SERPL CALCULATED.3IONS-SCNC: 7 MMOL/L (ref 3–14)
APPEARANCE UR: ABNORMAL
AST SERPL W P-5'-P-CCNC: 16 U/L (ref 13–39)
BACTERIA SPEC CULT: NORMAL
BASOPHILS # BLD AUTO: 0 10E9/L (ref 0–0.2)
BASOPHILS NFR BLD AUTO: 0.4 %
BILIRUB SERPL-MCNC: 0.4 MG/DL (ref 0.3–1)
BILIRUB UR QL STRIP: NEGATIVE
BUN SERPL-MCNC: 13 MG/DL (ref 7–25)
C DIFF TOX B STL QL: NEGATIVE
CALCIUM SERPL-MCNC: 8.9 MG/DL (ref 8.6–10.3)
CHLORIDE SERPL-SCNC: 106 MMOL/L (ref 98–107)
CO2 SERPL-SCNC: 28 MMOL/L (ref 21–31)
COLOR UR AUTO: YELLOW
CREAT SERPL-MCNC: 0.71 MG/DL (ref 0.6–1.2)
DIFFERENTIAL METHOD BLD: NORMAL
EOSINOPHIL # BLD AUTO: 0.2 10E9/L (ref 0–0.7)
EOSINOPHIL NFR BLD AUTO: 2.5 %
ERYTHROCYTE [DISTWIDTH] IN BLOOD BY AUTOMATED COUNT: 12.9 % (ref 10–15)
GFR SERPL CREATININE-BSD FRML MDRD: 77 ML/MIN/{1.73_M2}
GLUCOSE SERPL-MCNC: 83 MG/DL (ref 70–105)
GLUCOSE UR STRIP-MCNC: NEGATIVE MG/DL
HCT VFR BLD AUTO: 36.7 % (ref 35–47)
HGB BLD-MCNC: 12.2 G/DL (ref 11.7–15.7)
HGB UR QL STRIP: ABNORMAL
HYALINE CASTS #/AREA URNS LPF: 10 /LPF (ref 0–2)
IMM GRANULOCYTES # BLD: 0 10E9/L (ref 0–0.4)
IMM GRANULOCYTES NFR BLD: 0.3 %
KETONES UR STRIP-MCNC: NEGATIVE MG/DL
LEUKOCYTE ESTERASE UR QL STRIP: ABNORMAL
LYMPHOCYTES # BLD AUTO: 2.2 10E9/L (ref 0.8–5.3)
LYMPHOCYTES NFR BLD AUTO: 28.6 %
MCH RBC QN AUTO: 31.4 PG (ref 26.5–33)
MCHC RBC AUTO-ENTMCNC: 33.2 G/DL (ref 31.5–36.5)
MCV RBC AUTO: 94 FL (ref 78–100)
MONOCYTES # BLD AUTO: 0.7 10E9/L (ref 0–1.3)
MONOCYTES NFR BLD AUTO: 8.6 %
MUCOUS THREADS #/AREA URNS LPF: PRESENT /LPF
NEUTROPHILS # BLD AUTO: 4.5 10E9/L (ref 1.6–8.3)
NEUTROPHILS NFR BLD AUTO: 59.6 %
NITRATE UR QL: NEGATIVE
PH UR STRIP: 6.5 PH (ref 5–7)
PLATELET # BLD AUTO: 209 10E9/L (ref 150–450)
POTASSIUM SERPL-SCNC: 3.8 MMOL/L (ref 3.5–5.1)
PROT SERPL-MCNC: 6.7 G/DL (ref 6.4–8.9)
RBC # BLD AUTO: 3.89 10E12/L (ref 3.8–5.2)
RBC #/AREA URNS AUTO: 17 /HPF (ref 0–2)
SARS-COV-2 RNA SPEC QL NAA+PROBE: NOT DETECTED
SARS-COV-2 RNA SPEC QL NAA+PROBE: NOT DETECTED
SODIUM SERPL-SCNC: 141 MMOL/L (ref 134–144)
SOURCE: ABNORMAL
SP GR UR STRIP: 1.01 (ref 1–1.03)
SPECIMEN SOURCE: NORMAL
SQUAMOUS #/AREA URNS AUTO: 2 /HPF (ref 0–1)
TRANS CELLS #/AREA URNS HPF: 1 /HPF
UROBILINOGEN UR STRIP-MCNC: NORMAL MG/DL (ref 0–2)
WBC # BLD AUTO: 7.6 10E9/L (ref 4–11)
WBC #/AREA URNS AUTO: >182 /HPF (ref 0–5)
WBC CLUMPS #/AREA URNS HPF: PRESENT /HPF

## 2020-01-01 PROCEDURE — 99310 SBSQ NF CARE HIGH MDM 45: CPT | Performed by: NURSE PRACTITIONER

## 2020-01-01 PROCEDURE — 99214 OFFICE O/P EST MOD 30 MIN: CPT | Mod: 95 | Performed by: FAMILY MEDICINE

## 2020-01-01 PROCEDURE — 99214 OFFICE O/P EST MOD 30 MIN: CPT | Mod: 95 | Performed by: INTERNAL MEDICINE

## 2020-01-01 RX ORDER — AZITHROMYCIN 250 MG/1
TABLET, FILM COATED ORAL
Qty: 6 TABLET | Refills: 0 | Status: SHIPPED | OUTPATIENT
Start: 2020-01-01 | End: 2020-01-01

## 2020-01-01 RX ORDER — NITROFURANTOIN 25; 75 MG/1; MG/1
100 CAPSULE ORAL 2 TIMES DAILY
Qty: 10 CAPSULE | Refills: 0 | Status: SHIPPED | OUTPATIENT
Start: 2020-01-01 | End: 2020-01-01 | Stop reason: ALTCHOICE

## 2020-01-01 RX ORDER — TRAMADOL HYDROCHLORIDE 50 MG/1
TABLET ORAL
Qty: 180 TABLET | Refills: 1 | Status: SHIPPED | OUTPATIENT
Start: 2020-01-01

## 2020-01-01 ASSESSMENT — ANXIETY QUESTIONNAIRES
6. BECOMING EASILY ANNOYED OR IRRITABLE: NOT AT ALL
7. FEELING AFRAID AS IF SOMETHING AWFUL MIGHT HAPPEN: NOT AT ALL
GAD7 TOTAL SCORE: 0
3. WORRYING TOO MUCH ABOUT DIFFERENT THINGS: NOT AT ALL
GAD7 TOTAL SCORE: 0
1. FEELING NERVOUS, ANXIOUS, OR ON EDGE: NOT AT ALL
2. NOT BEING ABLE TO STOP OR CONTROL WORRYING: NOT AT ALL
5. BEING SO RESTLESS THAT IT IS HARD TO SIT STILL: NOT AT ALL

## 2020-01-01 ASSESSMENT — PATIENT HEALTH QUESTIONNAIRE - PHQ9
5. POOR APPETITE OR OVEREATING: NOT AT ALL
SUM OF ALL RESPONSES TO PHQ QUESTIONS 1-9: 0

## 2020-01-01 ASSESSMENT — PAIN SCALES - GENERAL
PAINLEVEL: NO PAIN (0)
PAINLEVEL: NO PAIN (0)

## 2020-01-08 ENCOUNTER — MEDICAL CORRESPONDENCE (OUTPATIENT)
Dept: HEALTH INFORMATION MANAGEMENT | Facility: OTHER | Age: 85
End: 2020-01-08

## 2020-01-08 ENCOUNTER — RESULTS ONLY (OUTPATIENT)
Dept: LAB | Age: 85
End: 2020-01-08

## 2020-01-08 LAB
CREAT SERPL-MCNC: 0.68 MG/DL (ref 0.6–1.2)
GFR SERPL CREATININE-BSD FRML MDRD: 81 ML/MIN/{1.73_M2}

## 2020-01-21 ENCOUNTER — NURSING HOME VISIT (OUTPATIENT)
Dept: GERIATRICS | Facility: OTHER | Age: 85
End: 2020-01-21
Attending: NURSE PRACTITIONER
Payer: COMMERCIAL

## 2020-01-21 DIAGNOSIS — K21.9 GASTROESOPHAGEAL REFLUX DISEASE WITHOUT ESOPHAGITIS: ICD-10-CM

## 2020-01-21 DIAGNOSIS — I10 BENIGN ESSENTIAL HYPERTENSION: ICD-10-CM

## 2020-01-21 DIAGNOSIS — F51.05 INSOMNIA DUE TO OTHER MENTAL DISORDER: ICD-10-CM

## 2020-01-21 DIAGNOSIS — F02.818 DEMENTIA IN ALZHEIMER'S DISEASE WITH EARLY ONSET WITH BEHAVIORAL DISTURBANCE (H): Primary | ICD-10-CM

## 2020-01-21 DIAGNOSIS — F99 INSOMNIA DUE TO OTHER MENTAL DISORDER: ICD-10-CM

## 2020-01-21 DIAGNOSIS — G30.0 DEMENTIA IN ALZHEIMER'S DISEASE WITH EARLY ONSET WITH BEHAVIORAL DISTURBANCE (H): Primary | ICD-10-CM

## 2020-01-21 PROCEDURE — 99310 SBSQ NF CARE HIGH MDM 45: CPT | Performed by: NURSE PRACTITIONER

## 2020-01-28 NOTE — PROGRESS NOTES
[facility] Long Term [facility]  60 DAY RECERTIFICATION     Patient Name: Tia Garrett  YOB: 1927 Age: 89 year old  Medical RecordNumber: [id number]  Primary Provider: Dr. Constantino     Advanced Directives: DNR/DNI selective treatment     Date admitted to [facility]: 8/2017        HPI: Patient is seen today for 60 day evaluation for the following medical issues:  Dementia with behavioral disturbance, gerd, insomnia, and hypertension.  Pt has been doing well. Her behaviors are much better and she seems happier and talkative but also has times where she is grumpy, mostly during cares. She has been sleeping through the night. Currently taking celexa and melatonin for this.   Occasional pain in knees. Tramadol and tylenol for pain.   She seems most troubled by spitting up yellow/clear phlegm and spits it on the floor. It seems to be sinus congestion or possibly allergy related. This has not really improved with flonase or allergy medication.   No fevers.      ALLERGIES:  No Known Allergies     Past Medical History:    has no past medical history on file.     Past Surgical History:   has a past surgical history that includes other surgical history; other surgical history; and other surgical history.     Family History:  family history is not on file.     Social History:   reports that she has never smoked. She has never used smokeless tobacco. She reports that she does not drink alcohol.     Immunizations:        Immunization History   Administered Date(s) Administered     Influenza (IIV3) PF 12/14/2012     Pneumo Conj 13-V (2010&after) 05/31/2016     TDAP Vaccine (Boostrix) 05/31/2016         Current Medications:       Current Outpatient Medications   Medication     Acetaminophen 325 MG CAPS     Acetaminophen 325 MG CAPS     ammonium lactate (LAC-HYDRIN) 12 % lotion     HYDROcodone-acetaminophen (NORCO) 5-325 MG tablet     Menthol, Topical Analgesic, (BIOFREEZE) 4 % GEL     risperiDONE (RISPERDAL)  0.5 MG tablet     senna-docusate (SENOKOT-S/PERICOLACE) 8.6-50 MG tablet     traMADol (ULTRAM) 50 MG tablet      No current facility-administered medications for this visit.       Medication reconciliation completed between [facility] record and [facility] MAR.         CURRENTLYENROLLED IN: Facility PT/restorative therapy     Current Diet: [facility] soft diet, thin liquids     Review Of Systems:    Limited ROS due to dementia. See HPI.   Denies pain   Quite humorous today and reading an article from nursing home but not able to give much related to ROS.      Vital Signs: (obtained from nursing home record)  Temp 97.6   Pulse 74   Resp 16   /74   O2 Sats 97%   Weight 146 #      Objective:     Pleasantly confusted and alert without distress.    Sclera nonicteric, conjunctiva non-inflamed.  Skin color pink.   Neck supple and without adenopathy   Lungs Clear  Cardiovascular regular  Abdomen soft and without tenderness   Extremities without edema.   Able to move upper and lower extremities         Assessment and Plan:  (F03.91) Dementia with behavioral disturbance, unspecified dementia type (H)  (primary encounter diagnosis)  Comment: progressive  Plan: Pt is doing well with celexa and melatonin  (much better than when she was taking risperdal).      (K21.9) Gastroesophageal reflux disease without esophagitis  Comment: stable  Plan: currently not needing medication for this. Has been on zantac in the past.      (F51.05,  F99) Insomnia due to other mental disorder  Comment: stable, sleeping 7-8 hours at night  Plan: cont melatonin 1 mg at bedtime       (I10) Benign essential hypertension  Comment: stable  Plan: currently not needing medication         Multidisciplinary notes, laboratory values, medications, vital signs, weight and orders are reviewed from nursing home records. I have reviewed the patient s medical history and updated the computerized patient record.      A total of 40 minutes was spent with this patient, of which more  than 50% was spent in counseling and/or coordination of care.   CRESENCIO Romero, CNP  1/28/2020 4: 40 PM

## 2020-02-25 ENCOUNTER — NURSING HOME VISIT (OUTPATIENT)
Dept: GERIATRICS | Facility: OTHER | Age: 85
End: 2020-02-25
Attending: NURSE PRACTITIONER
Payer: COMMERCIAL

## 2020-02-25 DIAGNOSIS — M17.11 PRIMARY OSTEOARTHRITIS OF RIGHT KNEE: ICD-10-CM

## 2020-02-25 DIAGNOSIS — F02.818 DEMENTIA IN ALZHEIMER'S DISEASE WITH EARLY ONSET WITH BEHAVIORAL DISTURBANCE (H): Primary | ICD-10-CM

## 2020-02-25 DIAGNOSIS — M51.369 DEGENERATION OF LUMBAR INTERVERTEBRAL DISC: ICD-10-CM

## 2020-02-25 DIAGNOSIS — G30.0 DEMENTIA IN ALZHEIMER'S DISEASE WITH EARLY ONSET WITH BEHAVIORAL DISTURBANCE (H): Primary | ICD-10-CM

## 2020-02-25 PROCEDURE — 99310 SBSQ NF CARE HIGH MDM 45: CPT | Performed by: NURSE PRACTITIONER

## 2020-03-05 NOTE — PROGRESS NOTES
"[facility]  Face to face for wheelchair assessment    Patient Name: Tia Garrett   : 1927  MRN: [id number]    Place of Service: [facility]  DOS: 2020    CC: face to face for wheelchair assessment    HPI:  Tia Garrett is a 89 year old female with PMH of multiple chronic medical concerns, who is seen today regarding face to face for wheelchair assessment.        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 She also has advanced dementia which has affected her fine and gross motor skills and balance therefore she is not safe to use a walker or cane.   Right knee xray done 19 showed the following: \"Severe patellofemoral DJD.   Severe medial and lateral joint space narrowing.\"  Lumbar spine xray done 19: \"Diffuse demineralization.\"   Manual wheelchair would be able to be wheeled a little bit by patient but her dementia would complicate her appropriate destination. Staff would be available to transport her where she needs to go.   Due to severe DJD and age related changes of spine and advanced dementia pt is a high fall risk.   Tramadol, tylenol, and biofreeze used for pain.       Multidisciplinary notes, laboratory values, medications, vital signs, weight and orders arereviewed from nursing home records. I have reviewed the patient s medical history and updated the computerized patient record.     PMH:  No past medical history on file.    Medications:  Current Outpatient Medications   Medication Sig Dispense Refill     Acetaminophen 325 MG CAPS Take 650 mg by mouth every 4 hours as needed 100 capsule 0     Acetaminophen 325 MG CAPS Take 650 mg by mouth 4 times daily And 650 mg q 24 hours PRN pain. 100 capsule      ammonium lactate (LAC-HYDRIN) 12 % lotion " Apply topically 2 times daily       citalopram (CELEXA) 10 MG tablet Take 1 tablet (10 mg) by mouth daily       HYDROcodone-acetaminophen (NORCO) 5-325 MG tablet Take 1 tablet by mouth every 6 hours as needed for pain 18 tablet 0     melatonin 1 MG TABS tablet Take 1 tablet (1 mg) by mouth At Bedtime       Menthol, Topical Analgesic, (BIOFREEZE) 4 % GEL        senna-docusate (SENOKOT-S/PERICOLACE) 8.6-50 MG tablet Take 2 tablets by mouth 2 times daily       traMADol (ULTRAM) 50 MG tablet Take 0.5 tablets (25 mg) by mouth every 6 hours as needed for severe pain 10 tablet 0     Medication reconciliation complete between Epic record and NH MAR.     Allergies:  No Known Allergies    Review of Systems:  Limited ROS due to dementia. See HPI.       Vital Signs:  Temp 98.6   Pulse 85   Respirations 16   /85   Oxygen Sats 94%   Weight 133.2#    Physical Exam:     Pleasant and alert without distress. Sitting in wheelchair at small table looking out window. Pt is not able to carry on a conversation---words are not sensible to conversation.    Sclera nonicteric, conjunctiva non-inflamed.  Skin color pink. Mucous membranes moist.   Neck supple and without adenopathy   Lungs clear   Cardiovascular regular  Abdomen soft and without tenderness   Extremities with mild edema.     Able to move upper and lower extremities with significant right knee discomfort noted with passive/active range of motion. Palpation of low back appears tender.       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 OT consult for wheelchair  assessment    (M51.36) Degeneration of lumbar intervertebral disc  Comment: diffuse degeneration causing low back pain  Plan: cont tramadol, tylenol, and biofreeze for pain. OT consult for wheelchair assessment          A total of 41 minutes was spent with this patient, of which more than 50% was spent in counseling and/or coordination of care.     CRESENCIO Romero, CNP ....................  3/5/2020   9:31 AM

## 2020-03-16 ENCOUNTER — NURSING HOME VISIT (OUTPATIENT)
Dept: GERIATRICS | Facility: OTHER | Age: 85
End: 2020-03-16
Attending: INTERNAL MEDICINE
Payer: COMMERCIAL

## 2020-03-16 DIAGNOSIS — K21.9 GASTROESOPHAGEAL REFLUX DISEASE, ESOPHAGITIS PRESENCE NOT SPECIFIED: ICD-10-CM

## 2020-03-16 DIAGNOSIS — K44.9 HIATAL HERNIA: ICD-10-CM

## 2020-03-16 DIAGNOSIS — F99 INSOMNIA DUE TO OTHER MENTAL DISORDER: ICD-10-CM

## 2020-03-16 DIAGNOSIS — F51.05 INSOMNIA DUE TO OTHER MENTAL DISORDER: ICD-10-CM

## 2020-03-16 DIAGNOSIS — Z78.9 NURSING HOME RESIDENT: Primary | ICD-10-CM

## 2020-03-16 DIAGNOSIS — F02.818 DEMENTIA IN ALZHEIMER'S DISEASE WITH EARLY ONSET WITH BEHAVIORAL DISTURBANCE (H): ICD-10-CM

## 2020-03-16 DIAGNOSIS — I10 BENIGN ESSENTIAL HYPERTENSION: ICD-10-CM

## 2020-03-16 DIAGNOSIS — G30.0 DEMENTIA IN ALZHEIMER'S DISEASE WITH EARLY ONSET WITH BEHAVIORAL DISTURBANCE (H): ICD-10-CM

## 2020-03-16 PROCEDURE — 99308 SBSQ NF CARE LOW MDM 20: CPT | Performed by: INTERNAL MEDICINE

## 2020-03-16 NOTE — PROGRESS NOTES
[facility]    No chief complaint on file.      HPI:    Tia Garrett is a 89 year old  (11/24/1927), who is being seen today for a federally mandated E/M visit at [facility].  HPI information obtained from: facility chart records, facility staff and patient report. Today's concerns are:    ICD-10-CM    1. Nursing home resident - [facility] - Recertification   Z59.3    2. 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 Insomnia due to other mental disorder  F51.05     F99    4. Benign essential hypertension  I10    5. Hiatal hernia  K44.9    6. Gastroesophageal reflux disease, esophagitis presence not specified  K21.9      Patient is seen for routine nursing home recertification.    Her dementia has progressed and she has now been moved over to the memory care unit.    She has insomnia due to Alzheimer's disease.    Hypertension, blood pressures are currently controlled.  No medication changes today.    History of heartburn and reflux, has hiatal hernia.  Denies symptoms or concerns today.  No medication changes.    ALLERGIES: Patient has no known allergies.  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  Document not previously scanned. Validation form  completed indicating invalid document. Copy sent to client with information and facilitation resources. Validation form sent to be scanned as notation of invalid document received.  Code Status reflects choices in most recent ACP document.  Confirmed/documented designated decision maker(s).  Added by Roula Dewey RN Advance Care Planning Liaison with Charlene Mcconnell  Advance Care Planning 5/31/2016: ACP Review of Chart / Resources Provided:  Reviewed chart for advance care plan.  Tia Garrett has no plan or code status on file however states presence of ACP document. Copy requested. Confirmed code status reflects current choices pending receipt of document/advance care plan review.  Confirmed/documented legally designated decision makers.  Added by Latia Lopez             Benign essential hypertension 05/31/2013     Priority: Medium     Hiatal hernia 05/31/2013     Priority: Medium     GERD (gastroesophageal reflux disease) 05/31/2013     Priority: Medium     PAST MEDICAL HISTORY: No past medical history on file.  PAST SURGICAL HISTORY:  has a past surgical history that includes other surgical history; other surgical history; and other surgical history.  FAMILY HISTORY: family history is not on file.  SOCIAL HISTORY:  reports that she has never smoked. She has never used smokeless tobacco. She reports that she does not drink alcohol.    MEDICATIONS: Medications reviewed from facility chart.    Case Management:  I have reviewed the care plan and MDS and do agree with the plan. Patient's desire to return to the community is not present.  Information reviewed:  Medications, vital signs, orders, and nursing notes.    ROS:  Review of Systems   Unable to perform ROS: Dementia   HENT: Positive for hearing loss.        Exam:  Vitals: -- Vitals reviewed from nursing home.   There were no vitals taken for this visit.  BMI= There is no height or weight on file to calculate BMI.  Physical Exam  HENT:      Head:  Normocephalic.   Eyes:      General: No scleral icterus.     Conjunctiva/sclera: Conjunctivae normal.   Cardiovascular:      Rate and Rhythm: Normal rate and regular rhythm.   Pulmonary:      Effort: Pulmonary effort is normal.      Breath sounds: No wheezing.   Abdominal:      Palpations: Abdomen is soft.      Tenderness: There is no abdominal tenderness.   Musculoskeletal:         General: No swelling.   Skin:     General: Skin is warm and dry.   Neurological:      Mental Status: She is alert. Mental status is at baseline.   Psychiatric:         Mood and Affect: Mood normal.      Comments: + Dementia         Lab/Diagnostic data:   CBC RESULTS:   Recent Labs   Lab Test 07/21/17  1028 04/29/17  1029 02/02/17  1032   WBC  --  6.7 5.5   RBC  --  4.49 4.04   HGB 12.6 13.6 12.2   HCT 36.5 40.7 37.0   MCV 92 91 92   MCH 31.6 30.3 30.2   MCHC 34.5 33.4 33.0   RDW 12.7 12.9 13.3    241 185       Last Basic Metabolic Panel:  Recent Labs   Lab Test 01/08/20  0915 02/19/19  1100 07/21/17  1042 04/29/17  1029   NA  --   --  140 139   POTASSIUM  --   --  3.5 3.5   CHLORIDE  --   --  106 102   TAMMY  --   --  9.1 8.7   CO2  --   --  24 27   BUN  --   --  16 20   CR 0.68 0.64 0.65* 0.80   GLC  --   --  98 109*       Liver Function Studies -   Recent Labs   Lab Test 07/21/17  1042 04/29/17  1029 02/02/17  1032   PROTTOTAL 6.6 7.9 7.2   ALBUMIN 3.9 4.3 3.7   BILITOTAL 0.5 0.6 0.5   ALKPHOS 68 86 78   AST  --  18 18   ALT  --  16 19       No results found for: TSH]    No results found for: A1C    ASSESSMENT/PLAN  Problem List Items Addressed This Visit        Nervous and Auditory    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   Continue current medications and treatments.   May use standing orders.       Lino Kilgore MD  Internal Medicine  [facility]     Portions of this note were dictated using speech recognition software. The note has been proofread but errors in the text may have been overlooked. Please contact me if there are any concerns regarding the accuracy of the dictation.

## 2020-05-13 ENCOUNTER — MEDICAL CORRESPONDENCE (OUTPATIENT)
Dept: HEALTH INFORMATION MANAGEMENT | Facility: OTHER | Age: 85
End: 2020-05-13

## 2020-05-13 ENCOUNTER — RESULTS ONLY (OUTPATIENT)
Dept: LAB | Age: 85
End: 2020-05-13

## 2020-05-13 ENCOUNTER — NURSING HOME VISIT (OUTPATIENT)
Dept: GERIATRICS | Facility: OTHER | Age: 85
End: 2020-05-13
Attending: NURSE PRACTITIONER
Payer: COMMERCIAL

## 2020-05-13 DIAGNOSIS — G30.0 DEMENTIA IN ALZHEIMER'S DISEASE WITH EARLY ONSET WITH BEHAVIORAL DISTURBANCE (H): Primary | ICD-10-CM

## 2020-05-13 DIAGNOSIS — M17.11 PRIMARY OSTEOARTHRITIS OF RIGHT KNEE: ICD-10-CM

## 2020-05-13 DIAGNOSIS — F99 INSOMNIA DUE TO OTHER MENTAL DISORDER: ICD-10-CM

## 2020-05-13 DIAGNOSIS — F51.05 INSOMNIA DUE TO OTHER MENTAL DISORDER: ICD-10-CM

## 2020-05-13 DIAGNOSIS — F02.818 DEMENTIA IN ALZHEIMER'S DISEASE WITH EARLY ONSET WITH BEHAVIORAL DISTURBANCE (H): Primary | ICD-10-CM

## 2020-05-13 LAB
ANION GAP SERPL CALCULATED.3IONS-SCNC: 10 MMOL/L (ref 3–14)
BASOPHILS # BLD AUTO: 0 10E9/L (ref 0–0.2)
BASOPHILS NFR BLD AUTO: 0.3 %
BUN SERPL-MCNC: 18 MG/DL (ref 7–25)
CALCIUM SERPL-MCNC: 9.3 MG/DL (ref 8.6–10.3)
CHLORIDE SERPL-SCNC: 108 MMOL/L (ref 98–107)
CO2 SERPL-SCNC: 26 MMOL/L (ref 21–31)
CREAT SERPL-MCNC: 0.7 MG/DL (ref 0.6–1.2)
DIFFERENTIAL METHOD BLD: NORMAL
EOSINOPHIL # BLD AUTO: 0.1 10E9/L (ref 0–0.7)
EOSINOPHIL NFR BLD AUTO: 1 %
ERYTHROCYTE [DISTWIDTH] IN BLOOD BY AUTOMATED COUNT: 13.1 % (ref 10–15)
GFR SERPL CREATININE-BSD FRML MDRD: 78 ML/MIN/{1.73_M2}
GLUCOSE SERPL-MCNC: 150 MG/DL (ref 70–105)
HCT VFR BLD AUTO: 37.2 % (ref 35–47)
HGB BLD-MCNC: 12 G/DL (ref 11.7–15.7)
IMM GRANULOCYTES # BLD: 0 10E9/L (ref 0–0.4)
IMM GRANULOCYTES NFR BLD: 0.3 %
LYMPHOCYTES # BLD AUTO: 1.9 10E9/L (ref 0.8–5.3)
LYMPHOCYTES NFR BLD AUTO: 21.8 %
MCH RBC QN AUTO: 31.3 PG (ref 26.5–33)
MCHC RBC AUTO-ENTMCNC: 32.3 G/DL (ref 31.5–36.5)
MCV RBC AUTO: 97 FL (ref 78–100)
MONOCYTES # BLD AUTO: 0.7 10E9/L (ref 0–1.3)
MONOCYTES NFR BLD AUTO: 8.3 %
NEUTROPHILS # BLD AUTO: 6 10E9/L (ref 1.6–8.3)
NEUTROPHILS NFR BLD AUTO: 68.3 %
PLATELET # BLD AUTO: 201 10E9/L (ref 150–450)
POTASSIUM SERPL-SCNC: 3.2 MMOL/L (ref 3.5–5.1)
RBC # BLD AUTO: 3.83 10E12/L (ref 3.8–5.2)
SODIUM SERPL-SCNC: 144 MMOL/L (ref 134–144)
WBC # BLD AUTO: 8.8 10E9/L (ref 4–11)

## 2020-05-13 PROCEDURE — 99310 SBSQ NF CARE HIGH MDM 45: CPT | Performed by: NURSE PRACTITIONER

## 2020-05-14 NOTE — PROGRESS NOTES
[facility]   [facility]  60 DAY RECERTIFICATION     Patient Name: Tia Garrett  YOB: 1927 Age: 89 year old  Medical RecordNumber: [id number]  Primary Provider: Dr. 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 She has moved to a new room in [facility] which is a locked dementia unit.  She continues to have behaviors with cares. She continues to spit up phlegm on the floor--seems to come from sinuses. She refuses to use flonase as she can not comprehend how to do it. She has been on zyrtec in the past with no improvement.  She has been sleeping through the night. Currently taking celexa and melatonin for this.   Occasional pain in knees. Tramadol and tylenol for pain.   No fevers.   She finds comfort in holding her stuffed animals.      ALLERGIES:  No Known Allergies     Past Medical History:    has no past medical history on file.     Past Surgical History:   has a past surgical history that includes other surgical history; other surgical history; and other surgical history.     Family History:  family history is not on file.     Social History:   reports that she has never smoked. She has never used smokeless tobacco. She reports that she does not drink alcohol.     Immunizations:           Immunization History   Administered Date(s) Administered     Influenza (IIV3) PF 12/14/2012     Pneumo Conj 13-V (2010&after) 05/31/2016     TDAP Vaccine (Boostrix) 05/31/2016         Current Medications:         Current Outpatient Medications   Medication     Acetaminophen 325 MG CAPS     Acetaminophen 325 MG CAPS     ammonium lactate (LAC-HYDRIN) 12 % lotion      HYDROcodone-acetaminophen (NORCO) 5-325 MG tablet     Menthol, Topical Analgesic, (BIOFREEZE) 4 % GEL     risperiDONE (RISPERDAL) 0.5 MG tablet     senna-docusate (SENOKOT-S/PERICOLACE) 8.6-50 MG tablet     traMADol (ULTRAM) 50 MG tablet      No current facility-administered medications for this visit.       Medication reconciliation completed between [facility] record and NH MAR.       Current Diet: [facility] soft diet, thin liquids     Review Of Systems:    Limited ROS due to dementia. See HPI.   Denies pain        Vital Signs: (obtained from nursing home record)  Temp 98.9   Pulse 73   Resp 18   /99   O2 Sats 94%   Weight 137 # down 10# since Jan     Objective:     Pleasantly confused and alert without distress.    Sclera nonicteric, conjunctiva non-inflamed.  Skin color pink.   Neck supple and without adenopathy   Lungs Clear  Cardiovascular regular  Abdomen soft and without tenderness   Extremities without edema.   Able to move upper and lower extremities         Assessment and Plan:  (F03.91) Dementia with behavioral disturbance, unspecified dementia type (H)  (primary encounter diagnosis)  Comment: progressive  Plan: Pt is doing well with celexa and melatonin  (much better than when she was taking risperdal).       (F51.05,  F99) Insomnia due to other mental disorder  Comment: stable, sleeping 7-8 hours at night  Plan: cont melatonin 1 mg at bedtime       (M17.11) Primary osteoarthritis of right knee  Comment: chronic  Plan: cont tramadol and tylenol.          Multidisciplinary notes, laboratory values, medications, vital signs, weight and orders are reviewed from nursing home records. I have reviewed the patient s medical history and updated the computerized patient record.      A total of 40 minutes was spent with this patient, of which more than 50% was spent in counseling and/or coordination of care.   CRESENCIO Romero, CNP  5/14/2020 3:21 PM

## 2020-07-09 NOTE — PROGRESS NOTES
"Tia Garrett is a 89 year old female who is being evaluated via a billable video visit.      The patient has been notified of following:     \"This video visit will be conducted via a call between you and your physician/provider. We have found that certain health care needs can be provided without the need for an in-person physical exam.  This service lets us provide the care you need with a video conversation.  If a prescription is necessary we can send it directly to your pharmacy.  If lab work is needed we can place an order for that and you can then stop by our lab to have the test done at a later time.    Video visits are billed at different rates depending on your insurance coverage.  Please reach out to your insurance provider with any questions.    If during the course of the call the physician/provider feels a video visit is not appropriate, you will not be charged for this service.\"    Patient has given verbal consent for Video visit? Yes  How would you like to obtain your AVS? Mail a copy  Patient would like the video invitation sent by: Other e-mail: [email or web address]  Will anyone else be joining your video visit? No    Subjective     Tia Garrett is a 89 year old female who presents today via video visit for the following health issues:    HPI    Nursing home recertification.  Carried out with both  staff and and patient.  Currently patient does have a history of dementia and is not able to volunteer much in the way of information.  Nursing nursing home staff reports no current concerns except little behavior.  At times she refuses medical cares.  But typically they can redirect redirected and it resolves.  She does have a history of pain.  The Ultram and Biofreeze that she is on is helping the pain.    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 (H)     DNR (do not resuscitate)     Nursing home resident - [facility] - Recertification      Wears hearing aid     Primary osteoarthritis of right knee     Insomnia due to other mental disorder     Past Surgical History:   Procedure Laterality Date     OTHER SURGICAL HISTORY      1943,SUR38,APPENDECTOMY OPEN     OTHER SURGICAL HISTORY      SHX83,TOTAL ABDOMINAL HYSTERECTOMY W/ BILATERAL SALPINGOOPHORECTOMY     OTHER SURGICAL HISTORY      07/2015,[id number],HIP FRACTURE TX,Right       Social History     Tobacco Use     Smoking status: Never Smoker     Smokeless tobacco: Never Used   Substance Use Topics     Alcohol use: No     History reviewed. No pertinent family history.      Current Outpatient Medications   Medication Sig Dispense Refill     Acetaminophen 325 MG CAPS Take 650 mg by mouth 4 times daily And 650 mg q 24 hours PRN pain. 100 capsule      ammonium lactate (LAC-HYDRIN) 12 % lotion Apply topically 2 times daily       citalopram (CELEXA) 10 MG tablet Take 1 tablet (10 mg) by mouth daily       melatonin 1 MG TABS tablet Take 1 tablet (1 mg) by mouth At Bedtime       Menthol, Topical Analgesic, (BIOFREEZE) 4 % GEL        senna-docusate (SENOKOT-S/PERICOLACE) 8.6-50 MG tablet Take 2 tablets by mouth 2 times daily       traMADol (ULTRAM) 50 MG tablet Take 0.5 tablets (25 mg) by mouth every 6 hours as needed for severe pain 10 tablet 0     Allergies   Allergen Reactions     Albuterol Rash     Symbicort Rash     Broke out in rash       Reviewed and updated as needed this visit by Provider         Review of Systems   Constitutional, HEENT, cardiovascular, pulmonary, gi and gu systems are negative, except as otherwise noted.      Objective             Physical Exam     GENERAL: Healthy, alert and no distress  EYES: Eyes grossly normal to inspection.  No discharge or erythema, or obvious scleral/conjunctival  abnormalities.  SKIN: Visible skin clear. No significant rash, abnormal pigmentation or lesions.  Video visual field was poor.  Patient has difficulty understanding questions.          Labs reviewed in Epic        Assessment & Plan   Assessment      Plan  1. Nursing home resident - [facility] - Recertification   Patient is currently stable.    2. 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  They do not feel that medications are needed for any behavioral issues.  Forms are signed.  I did advise them they should          With PCP    No follow-ups on file.    Aayush Montilla MD  [facility]          Video-Visit Details    Type of service:  Video Visit    Video End Time:1:35 PM    Originating Location (pt. Location): Long term Care    Distant Location (provider location):  [facility]     Platform used for Video Visit: Had[employer]    No follow-ups on file.       Aayush Montilla MD

## 2020-07-09 NOTE — NURSING NOTE
Patient scheduled for a video visit for nursing home  Re certification. Staff states ultram manages her pain along with scheduled tylenol and biofreeze topical to knees.  Medication Reconciliation: complete.    Jojo Adhikari LPN  7/9/2020 1:08 PM

## 2020-07-13 NOTE — TELEPHONE ENCOUNTER
Also needs MD communication sheet, and order summary report.    Micki Frankel on 7/13/2020 at 12:07 PM

## 2020-07-13 NOTE — TELEPHONE ENCOUNTER
Patient had video visit on 07/09 for a NH recert. [facility] is waiting on medical appt. slip to be faxed back [phone]    Jaxson questions please call Amos at  [phone]    Micki Frankel on 7/13/2020 at 12:02 PM

## 2020-07-16 NOTE — TELEPHONE ENCOUNTER
Form sent to medical records to be faxed. Jojo Adhikari LPN .......................7/16/2020  3:58 PM

## 2020-09-08 NOTE — PROGRESS NOTES
[facility]   [facility]  60 DAY RECERTIFICATION     Patient Name: Tia Garrett  YOB: 1927 Age: 89 year old  Medical RecordNumber: [id number]  Primary Provider: Dr. 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 She currently resides on a locked dementia unit which is appropriate for her needs.  She continues to have behaviors with cares. She continues to spit up phlegm on the floor--seems to come from sinuses. She refuses to use flonase as she can not comprehend how to do it. She has been on zyrtec in the past with no improvement.  She has been sleeping through the night. Currently taking celexa and melatonin for this.   Occasional pain in knees. Tramadol and tylenol for pain.   No fevers.   She finds comfort in holding her stuffed animals.     Review of POLST: currently DNR selective treatment. Discussed with family her goals of care and it is agreed that comfort care is best for her given the extent of her dementia.      ALLERGIES:  No Known Allergies     Past Medical History:    has no past medical history on file.     Past Surgical History:   has a past surgical history that includes other surgical history; other surgical history; and other surgical history.     Family History:  family history is not on file.     Social History:   reports that she has never smoked. She has never used smokeless tobacco. She reports that she does not drink alcohol.     Immunizations:           Immunization History   Administered Date(s) Administered     Influenza (IIV3) PF 12/14/2012      Pneumo Conj 13-V (2010&after) 05/31/2016     TDAP Vaccine (Boostrix) 05/31/2016         Current Medications:         Current Outpatient Medications   Medication     Acetaminophen 325 MG CAPS     Acetaminophen 325 MG CAPS     ammonium lactate (LAC-HYDRIN) 12 % lotion     HYDROcodone-acetaminophen (NORCO) 5-325 MG tablet     Menthol, Topical Analgesic, (BIOFREEZE) 4 % GEL     risperiDONE (RISPERDAL) 0.5 MG tablet     senna-docusate (SENOKOT-S/PERICOLACE) 8.6-50 MG tablet     traMADol (ULTRAM) 50 MG tablet      No current facility-administered medications for this visit.       Medication reconciliation completed between [facility] record and NH MAR.       Current Diet: [facility] soft diet, thin liquids     Review Of Systems:    Limited ROS due to dementia. See HPI.   Denies pain         Vital Signs: (obtained from nursing home record)  Temp 97.0   Pulse 82   Resp 16   /72   O2 Sats 91%   Weight 136.6 # weight stable in past 4 months     Objective:     Pleasantly confused and alert without distress. Some words clear and others are word salad  Sclera nonicteric, conjunctiva non-inflamed.  Skin color pink.   Neck supple and without adenopathy   Lungs Clear  Cardiovascular regular  Abdomen soft and without tenderness   Extremities without edema.   Able to move upper and lower extremities         Assessment and Plan:  (F03.91) Dementia with behavioral disturbance, unspecified dementia type (H)  (primary encounter diagnosis)  Comment: progressive  Plan: Pt is doing well with celexa and melatonin  (much better than when she was taking risperdal).        (M17.11) Primary osteoarthritis of right knee  Comment: chronic  Plan: cont tramadol and tylenol.        (I10) Benign essential hypertension  Comment: stable  Plan: no medications at this time, controlled    Changed POLST to comfort care.     Multidisciplinary notes, laboratory values, medications, vital signs, weight and orders are reviewed from nursing home records. I have  reviewed the patient s medical history and updated the computerized patient record.      A total of 40 minutes was spent with this patient, of which more than 50% was spent in counseling and/or coordination of care.   CRESENCIO Romero, CNP  9/7/2020 7:29 PM

## 2020-10-28 NOTE — PROGRESS NOTES
"Tia Garrett is a 89 year old female who is being evaluated via a billable video visit.      The patient has been notified of following:     \"This video visit will be conducted via a call between you and your physician/provider. We have found that certain health care needs can be provided without the need for an in-person physical exam.  This service lets us provide the care you need with a video conversation.  If a prescription is necessary we can send it directly to your pharmacy.  If lab work is needed we can place an order for that and you can then stop by our lab to have the test done at a later time.    Video visits are billed at different rates depending on your insurance coverage.  Please reach out to your insurance provider with any questions.    If during the course of the call the physician/provider feels a video visit is not appropriate, you will not be charged for this service.\"    Patient has given verbal consent for Video visit? Yes  How would you like to obtain your AVS? Mail a copy  If you are dropped from the video visit, the video invite should be resent to: Send to e-mail at: No e-mail address on record  Will anyone else be joining your video visit? Nurse      Subjective     Tia Garrett is a 89 year old female who presents today via video visit for the following health issues:    HPI     Video Start Time: 1:59 PM    Review of Systems   Unable to perform ROS: Dementia   HENT: Positive for hearing loss.             Objective    Vitals - Patient Reported  Pain Score: No Pain (0)        Physical Exam     GENERAL: Healthy, alert and no distress  EYES: Eyes grossly normal to inspection.  No discharge or erythema, or obvious scleral/conjunctival abnormalities. Eyes are somewhat sunken into skull.   RESP: No audible wheeze, cough, or visible cyanosis.  No visible retractions or increased work of breathing.    SKIN: Visible skin clear. No significant rash, abnormal pigmentation or " lesions.  NEURO: Mentation appropriate for age. + Dementia  PSYCH: affect normal and appearance well-groomed.      Results Only on 08/23/2020   Component Date Value Ref Range Status     COVID-19 Virus PCR to [facility] - So* 08/23/2020 Nasopharyngeal   Final     COVID-19 Virus PCR to [facility] - Re* 08/23/2020 Not Detected   Final    Comment: Collection of multiple specimens from the same patient may be necessary to   detect the virus. The possibility of a false negative should be considered if   the patient's recent exposure or clinical presentation suggests 2019 nCOV   infection and diagnostic tests for other causes of illness are negative.   Repeat testing may be considered in this setting.  Viral RNA was extracted via a validated method and subsequently underwent   single step reverse transcriptase-real time polymerase chain reaction using   primers to the CDC specified N1,N2 gene targets of CoV2 and human RNP as an   internal control.  A negative result does not rule out the presence of real-time PCR inhibitors   in the specimen or COVID-19 RNA in concentrations below the limit of detection   of the assay. The possibility of a false negative should be considered if the   patients recent exposure or clinical presentation suggests COVID-19.   Additional testing or repeat testing requires consultation with the   laborator                           y.  Nasopharyngeal specimen is the preferred choice for swab-based SARS CoV2   testing. When collection of a nasopharyngeal swab is not possible the   following are acceptable alternatives:  an oropharyngeal (OP) specimen collected by a healthcare professional, or a   nasal mid-turbinate (NMT) swab collected by a healthcare professional or by   onsite self-collection (using a flocked tapered swab), or an anterior nares   specimen collected by a healthcare professional or by onsite self-collection   (using a round foam swab). (Centers for Disease Control)  Testing performed by  [facility], Room [address], [address] [place] [address], [place] [address]. This test was developed and its   performance characteristics determined by the [facility]. It has not been cleared or approved by the FDA.  The laboratory is regulated under the Clinical Laboratory Improvement   Amendments of 1988 (CLIA-88) as qualified to perform high-complexity testin                           g.   This test is used for clinical purposes. It should not be regarded as   investigational or for research.                 ICD-10-CM    1. Nursing home resident - [facility] - Recertification   Z59.3    2. 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 Insomnia due to other mental disorder  F51.05     F99    4. Primary osteoarthritis of right knee  M17.11    5. Wears hearing aid  Z97.4    6. Benign essential hypertension  I10    7. DNR (do not resuscitate)  Z66      Patient contacted via video visit due to virus pandemic.  She is due for nursing home recertification.    Nursing staff states that she has been doing quite well overall.  She did have a new small bruise noted recently.  No obvious cause.  This is currently being monitored.  No signs of infection reported.  Overall things are quite stable.  She has chronic arthritis of the knee, insomnia, all of these are currently stable.      Dementia with early onset with behavioral disturbance, chronic and ongoing.  Has not been having outbreaks or violence recently per nursing staff.    She does have hearing aids.    Hypertension, currently controlled.  Continue current medication regimen.    CODE STATUS is DNR/DNI.    No changes.  Appears stable.    Total plan of care reviewed.   Continue current medications and treatments.   May use standing orders.       Video-Visit Details    Type of service:  Video Visit    Video End Time:2:15 PM    Originating Location (pt.  Location): Long term Care    Distant Location (provider location):  [facility]     Platform used for Video Visit: Jannie Kilgore MD

## 2020-10-28 NOTE — PATIENT INSTRUCTIONS
No changes.     Total plan of care reviewed.   Continue current medications and treatments.   May use standing orders.

## 2020-10-30 NOTE — TELEPHONE ENCOUNTER
[id number] sent Rx request for the following:      TRAMADOL 50MG TABLET  Sig: TAKE 1/2 TAB (25MG) BY MOUTH THREE TIMES DAILY FOR PAIN      Last Prescription Date:   8/9/2019  Last Fill Qty/Refills:         10, R-0    Last Office Visit:              10/28/2020   Future Office visit:           none    Routing refill request to provider for review/approval because:  Drug not on the [facility], [facility] or [facility] refill protocol or controlled substance    Unable to complete prescription refill per RN Medication Refill Policy.................... Pawel Rios RN ....................  10/30/2020   1:53 PM

## 2020-11-02 NOTE — TELEPHONE ENCOUNTER
Latoya, nurse from [facility], calling and state that patient developed foul smelling loose stools( 1 per day), productive cough with green phlegm, decreased appetite, more fatigued and hard to arouse at times since Saturday.    States patient is DNR/comfort cares, patient has dementia/alzhiemers.  States patients family does not want patient to come in they would just like things done that the facility can do.   Such as chest Xray, labs, ? COVID test.    States patient tested positive on 9/3/2020 for COVID was asymptomatic.  States within the last week has been exposed to 2 staff the tested positive and resident but all asymptomatic.    States they stopped Senna yesterday PM shift was taking 2 tabs twice/day .  States they have been pushing fluids and patient did eat breakfast this morning.  Vital signs:  Pulse: upper 50's - lower 60's  O2 83% this morning   Diminished lung sounds    States they have been unable to check BP and get Oxygen on due to behaviors from Alzheimers.  Denies fever, nausea, and vomiting.  Latoya would like orders and advise.  Latoya states Ivone Dewey not in today and they have not been able to reach her but she will be in tomorrow.    Dr. Kilgore out of office will route to [facility] for review and consideration.    Sisi Dewey RN on 11/2/2020 at 9:35 AM

## 2020-11-02 NOTE — TELEPHONE ENCOUNTER
Will put in orders for COVID testing, UA, CMP, CBC, Chest x-ray.    Encourage oral fluids as tolerated. O2 titrate to 90% as patient will allow.  Put patient on isolation.    Will start on Z-tami and Augmentin. Rx will be sent to pharmacy. If family declines antibiotics, facility can notify pharmacy.    Keep family updated. Consider Hospice if worsening.    Brenda Underwood NP

## 2020-11-02 NOTE — TELEPHONE ENCOUNTER
Called and informed Latoya of [facility] response.  Will fax order to [facility] [phone]  Will route back to Brenda to sign off of antibiotic orders family would be okay with antibiotics.    Sisi Dewey RN on 11/2/2020 at 11:41 AM

## 2020-11-03 NOTE — RESULT ENCOUNTER NOTE
Please call the facility with the results. POSITIVE for UTI. Rx for nitrofurantoin 100 mg BID x 5 days sent. Encourage water intake (popsicles, jello, etc).     CRESENCIO Petit, AGNP-C  Internal Medicine

## 2020-11-04 PROBLEM — R50.9 FEVER AND CHILLS: Status: ACTIVE | Noted: 2020-01-01

## 2020-11-04 NOTE — PROGRESS NOTES
[facility]  Acute Care Visit    Patient Name: Tia Garrett   : 1927  MRN: [id number]    Place of Service: [facility]  DOS: 2020    CC: Fever, fatigue, reduced appetite    HPI:  Tia Garrett is a 89 year old female with PMH of multiple chronic medical concerns, who is seen today regarding symptoms that started on 10/31/20. Patient has productive cough with green phlegm-which is a chronic issue, diminished lung sounds. Oxygen saturation reduced to 83% on room air. Oxygen applied. Due to dementia, patient became agitated and removed the oxygen.  Unable to obtain full vitals due to agitation.   She had foul smelling loose stools one time daily, usually following eating.   She is having decrease in intake.   Staff note increase in sleeping, difficult to awaken at times.  She has no signs/symptoms of nausea, vomiting.   She began running a fever 2020 and reached 100.8 early this morning with some shivering. By mid-morning fever had decreased to 99.8.   Patient has a DNR, Comfort cares only requested. Family prefers to keep patient at facility.    Received new orders from on-call provider on  to check for COVID, UA/UC, CMP, CBC, chest xray, start isolation. Also started on Z Pack and Augmentin. Consider Hospice if condition worsening.    Labs reviewed CBC and CMP unremarkable. Covid-19 culture negative. CXR unremarkable. No pneumonia or heart failure.  UA/UC was unable to be obtained until after 2 doses of augmentin and 1 dose of azithromycin given.       Upon assessment today, patient is up in her wheelchair, having just eaten most of her lunch. She is alert and engaging in confused, nonsensicle conversation, per her baseline dementia.Smiling at times.  She has no noted respiratory distress and is on room air. Staff report her urine is cloudy and another large loose stool today.         Multidisciplinary notes, laboratory values, medications, vital signs, weight and  orders arereviewed from nursing home records. I have reviewed the patient s medical history and updated the computerized patient record.     PMH:  No past medical history on file.    Medications:  Current Outpatient Medications   Medication     Acetaminophen 325 MG CAPS     amoxicillin-clavulanate (AUGMENTIN) 875-125 MG tablet     azithromycin (ZITHROMAX) 250 MG tablet     citalopram (CELEXA) 10 MG tablet     guaiFENesin (ROBITUSSIN) 100 MG/5ML SYRP     melatonin 1 MG TABS tablet     Menthol, Topical Analgesic, (BIOFREEZE) 4 % GEL     nitroFURantoin macrocrystal-monohydrate (MACROBID) 100 MG capsule     senna-docusate (SENOKOT-S/PERICOLACE) 8.6-50 MG tablet     traMADol (ULTRAM) 50 MG tablet     No current facility-administered medications for this visit.       Medication reconciliation complete between [facility] record and NH MAR.     Allergies:  Allergies   Allergen Reactions     Albuterol Rash     Symbicort Rash     Broke out in rash       Review of Systems:  Limited ROS due to dementia. See HPI.      Vital Signs:  Temp 100.8in AM and by mid morning 99.8   Pulse 59   Respirations 20   /88   Oxygen Sats 97% recheck done on room air at lunch   Weight 135.2# relatively stable weight but slow progression down    Physical Exam:     Pleasant and alert without distress, smiles and is engaging in nonsensicle conversation.  Sclera nonicteric, conjunctiva non-inflamed.  Skin color pink. Mucous membranes moist.   Neck supple and without adenopathy   Lungs clear to auscultation throughout. No wheezes or rales noted.   Cardiovascular regular, S1, S2 auscultated.   Abdomen soft and without tenderness   Extremities without edema.    Able to move upper and lower extremities.     New Labs/Diagnostics:  Last Comprehensive Metabolic Panel:  Sodium   Date Value Ref Range Status   11/02/2020 141 134 - 144 mmol/L Final     Potassium   Date Value Ref Range Status   11/02/2020 3.8 3.5 - 5.1 mmol/L Final     Chloride   Date Value Ref  "Range Status   11/02/2020 106 98 - 107 mmol/L Final     Carbon Dioxide   Date Value Ref Range Status   11/02/2020 28 21 - 31 mmol/L Final     Anion Gap   Date Value Ref Range Status   11/02/2020 7 3 - 14 mmol/L Final     Glucose   Date Value Ref Range Status   11/02/2020 83 70 - 105 mg/dL Final     Urea Nitrogen   Date Value Ref Range Status   11/02/2020 13 7 - 25 mg/dL Final     Creatinine   Date Value Ref Range Status   11/02/2020 0.71 0.60 - 1.20 mg/dL Final     GFR Estimate   Date Value Ref Range Status   11/02/2020 77 >60 mL/min/[1.73_m2] Final     Calcium   Date Value Ref Range Status   11/02/2020 8.9 8.6 - 10.3 mg/dL Final     Bilirubin Total   Date Value Ref Range Status   11/02/2020 0.4 0.3 - 1.0 mg/dL Final     Alkaline Phosphatase   Date Value Ref Range Status   11/02/2020 47 34 - 104 U/L Final     ALT   Date Value Ref Range Status   11/02/2020 9 7 - 52 U/L Final     AST   Date Value Ref Range Status   11/02/2020 16 13 - 39 U/L Final            CBC RESULTS:   Recent Labs   Lab Test 11/02/20  1330   WBC 7.6   RBC 3.89   HGB 12.2   HCT 36.7   MCV 94   MCH 31.4   MCHC 33.2   RDW 12.9           UA RESULTS:  Recent Labs   Lab Test 11/03/20  0815 07/16/19  1315   COLOR Yellow Yellow   APPEARANCE Cloudy Slightly Cloudy   URINEGLC Negative Negative   URINEBILI Negative Negative   URINEKETONE Negative Trace*   SG 1.012 1.025   UBLD Small* Small*   URINEPH 6.5 5.0   PROTEIN 30* 30*   UROBILINOGEN  --  0.2   NITRITE Negative Positive*   LEUKEST Large* Moderate*   RBCU 17* 10-25*   WBCU >182* >100*     Assessment/Plan:  (R50.9) Fever and chills  Comment: Labs reviewed and stable. Chest xray results returned: \"There is large retrocardiac opacity most like a very large hiatal hernia.  No evidence of heart failure or pneumonia seen today.  No active disease in the chest\". UA returned, see above. Awaiting culture. Received new order from clinic to add Macrobid. Awaiting COVID results also, Patient remains in " isolation.  Plan: Do not start Macrobid. Continue Z Pack and Augmentin and await culture.     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       (K44.9) Hiatal hernia  Comment: stable, noted since 2013  Plan: cont to monitor    (F51.05,  F99) Insomnia due to other mental disorder  Comment: improved  Plan: cont melatonin    UC came back 11/5: NO growth. However due to significant UA results with WBCs present and hematuria will continue the same antibiotics. Also UA/UC was collected after patient had received 3 doses of antibiotics which may be why results came back negative. Pt is clinically doing much better today.     A total of 45 minutes was spent with this patient, of which more than 50% was spent in counseling and/or coordination of care.     Assessment done by Mary Reynoso, student NP and supervised by myself CRESENCIO Romero, CNP. Agree with plan.     CRESENCIO Romero, CNP ....................  11/4/2020   12:19 PM

## 2020-12-29 PROBLEM — R50.9 FEVER AND CHILLS: Status: RESOLVED | Noted: 2020-01-01 | Resolved: 2020-01-01

## 2020-12-29 NOTE — PROGRESS NOTES
[facility]  60 DAY RECERTIFICATION     Patient Name: Tia Garrett  YOB: 1927 Age: 89 year old  Medical RecordNumber: [id number]  Primary Provider: Dr. 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 She currently resides on a closed dementia unit which is appropriate for her needs.  She continues to have behaviors with cares. She has been sleeping through the night with the melatonin. Currently taking celexa which has improved her moods.   She has decreased intake with meals as she will not allow others to help her eat, closes her mouth.   Occasional pain in knees. Tramadol and tylenol for pain.   No fevers.   She finds comfort in holding her stuffed animals. She looks for people and tries to start a conversation but words are not understandable.       POLST: currently DNR comfort care which is appropriate given her progressive dementia.       ALLERGIES:  No Known Allergies     Past Medical History:    has no past medical history on file.     Past Surgical History:   has a past surgical history that includes other surgical history; other surgical history; and other surgical history.     Family History:  family history is not on file.     Social History:   reports that she has never smoked. She has never used smokeless tobacco. She reports that she does not drink alcohol.     Immunizations:           Immunization History   Administered Date(s) Administered     Influenza (IIV3) PF 12/14/2012     Pneumo Conj 13-V (2010&after) 05/31/2016     TDAP Vaccine (Boostrix) 05/31/2016         Current Medications:          Current Outpatient Medications   Medication     Acetaminophen 325 MG CAPS     Acetaminophen 325 MG CAPS     ammonium lactate (LAC-HYDRIN) 12 % lotion     HYDROcodone-acetaminophen (NORCO) 5-325 MG tablet     Menthol, Topical Analgesic, (BIOFREEZE) 4 % GEL     risperiDONE (RISPERDAL) 0.5 MG tablet     senna-docusate (SENOKOT-S/PERICOLACE) 8.6-50 MG tablet     traMADol (ULTRAM) 50 MG tablet      No current facility-administered medications for this visit.       Medication reconciliation completed between [facility] record and NH MAR.       Current Diet: [facility] soft diet, thin liquids     Review Of Systems:    Limited ROS due to dementia. See HPI.   Denies pain         Vital Signs: (obtained from nursing home record)  Temp 97.1   Pulse 62   Resp 18   /86   O2 Sats 96%   Weight 124.2 # down about 10# since Sept     Objective:     Pleasantly confused and alert without distress. Some words clear and others are word salad  Sclera nonicteric, conjunctiva non-inflamed.  Skin color pink.   Neck supple and without adenopathy   Lungs Clear  Cardiovascular regular  Abdomen soft and without tenderness   Extremities without edema.   Able to move upper and lower extremities         Assessment and Plan:  (F03.91) Dementia with behavioral disturbance, unspecified dementia type (H)  (primary encounter diagnosis)  Comment: progressive  Plan: Pt is doing well with celexa and melatonin  (much better than when she was taking risperdal).        (M17.11) Primary osteoarthritis of right knee  Comment: chronic  Plan: cont tramadol and tylenol.      (F51.05,  F99) Insomnia due to other mental disorder  Comment: stable  Plan: cont melatonin    Pt has lost another 10# since Sept. Will discuss with family these significant changes and possibly hospice care.      POLST to comfort care.      Multidisciplinary notes, laboratory values, medications, vital signs, weight and orders are reviewed from nursing home records. I have reviewed the patient s  medical history and updated the computerized patient record.      A total of 45 minutes was spent with this patient, of which more than 50% was spent in counseling and/or coordination of care.   CRESENCIO Romero, CNP  December 29, 2020 1:43PM

## 2021-01-01 ENCOUNTER — NURSING HOME VISIT (OUTPATIENT)
Dept: GERIATRICS | Facility: OTHER | Age: 86
End: 2021-01-01
Attending: NURSE PRACTITIONER
Payer: COMMERCIAL

## 2021-01-01 ENCOUNTER — MEDICAL CORRESPONDENCE (OUTPATIENT)
Dept: HEALTH INFORMATION MANAGEMENT | Facility: OTHER | Age: 86
End: 2021-01-01

## 2021-01-01 ENCOUNTER — VIRTUAL VISIT (OUTPATIENT)
Dept: INTERNAL MEDICINE | Facility: OTHER | Age: 86
End: 2021-01-01
Attending: INTERNAL MEDICINE
Payer: COMMERCIAL

## 2021-01-01 ENCOUNTER — TELEPHONE (OUTPATIENT)
Dept: INTERNAL MEDICINE | Facility: OTHER | Age: 86
End: 2021-01-01

## 2021-01-01 DIAGNOSIS — Z78.9 NURSING HOME RESIDENT: Primary | ICD-10-CM

## 2021-01-01 DIAGNOSIS — Z51.5 HOSPICE CARE PATIENT: ICD-10-CM

## 2021-01-01 DIAGNOSIS — F33.1 MODERATE EPISODE OF RECURRENT MAJOR DEPRESSIVE DISORDER (H): ICD-10-CM

## 2021-01-01 DIAGNOSIS — G30.0 DEMENTIA IN ALZHEIMER'S DISEASE WITH EARLY ONSET WITH BEHAVIORAL DISTURBANCE (H): Primary | ICD-10-CM

## 2021-01-01 DIAGNOSIS — F02.818 DEMENTIA IN ALZHEIMER'S DISEASE WITH EARLY ONSET WITH BEHAVIORAL DISTURBANCE (H): ICD-10-CM

## 2021-01-01 DIAGNOSIS — Z66 DNR (DO NOT RESUSCITATE): ICD-10-CM

## 2021-01-01 DIAGNOSIS — F02.818 DEMENTIA IN ALZHEIMER'S DISEASE WITH EARLY ONSET WITH BEHAVIORAL DISTURBANCE (H): Primary | ICD-10-CM

## 2021-01-01 DIAGNOSIS — F99 INSOMNIA DUE TO OTHER MENTAL DISORDER: ICD-10-CM

## 2021-01-01 DIAGNOSIS — M17.11 PRIMARY OSTEOARTHRITIS OF RIGHT KNEE: ICD-10-CM

## 2021-01-01 DIAGNOSIS — F51.05 INSOMNIA DUE TO OTHER MENTAL DISORDER: ICD-10-CM

## 2021-01-01 DIAGNOSIS — G30.0 DEMENTIA IN ALZHEIMER'S DISEASE WITH EARLY ONSET WITH BEHAVIORAL DISTURBANCE (H): ICD-10-CM

## 2021-01-01 DIAGNOSIS — K21.9 GASTROESOPHAGEAL REFLUX DISEASE, UNSPECIFIED WHETHER ESOPHAGITIS PRESENT: ICD-10-CM

## 2021-01-01 DIAGNOSIS — K44.9 HIATAL HERNIA: ICD-10-CM

## 2021-01-01 PROCEDURE — 99213 OFFICE O/P EST LOW 20 MIN: CPT | Mod: 95 | Performed by: INTERNAL MEDICINE

## 2021-01-01 PROCEDURE — 99310 SBSQ NF CARE HIGH MDM 45: CPT | Mod: GV | Performed by: NURSE PRACTITIONER

## 2021-01-01 PROCEDURE — 99309 SBSQ NF CARE MODERATE MDM 30: CPT | Performed by: NURSE PRACTITIONER

## 2021-02-25 PROBLEM — Z51.5 HOSPICE CARE PATIENT: Status: ACTIVE | Noted: 2021-01-01

## 2021-02-25 NOTE — PROGRESS NOTES
Tia is a 89 year old who is being evaluated via a billable video visit.      How would you like to obtain your AVS? Mail a copy  If the video visit is dropped, the invitation should be resent by: Send to e-mail at: No e-mail address on record  Will anyone else be joining your video visit? Yes, Nurse manager    Video Start Time: 3:40 PM    Assessment & Plan       ICD-10-CM    1. Nursing home resident - [facility] - Recertification   Z59.3    2. 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 Gastroesophageal reflux disease, unspecified whether esophagitis present  K21.9    4. Primary osteoarthritis of right knee  M17.11    5. Insomnia due to other mental disorder  F51.05     F99    6. Hiatal hernia  K44.9    7. DNR (do not resuscitate)  Z66    8. Hospice care patient  Z51.5      Patient is contacted via video visit due to virus pandemic.    She is due for nursing home recertification visit.    Staff states that she occasionally has some arthritis pain.  Does not have constant pain that is being reported by patient.  Pain on occasion, using Tramadol or oxycodone 1/2 tablet as needed, last reported 3/10 pain.     Patient has Alzheimer's dementia with behavioral disturbance.  Significant hearing loss also noted.    Has history of reflux problems but takes medication to help control this.  Has a hiatal hernia.    Melatonin at bedtime has helped her insomnia.    Patient is currently on hospice/comfort care.    Total plan of care reviewed.   Continue current medications and treatments.   May use standing orders.       Return for Return as needed for follow-up with Dr. iKlgore., Appointments: [phone].    Lino Kilgore MD  [facility]    Subjective   HPI   Review of Systems   Unable to perform ROS: Dementia   HENT: Positive for hearing loss.           Objective           Vitals:  No vitals were obtained today due to virtual  visit.    Physical Exam  Eyes:      General: No scleral icterus.     Conjunctiva/sclera: Conjunctivae normal.   Neurological:      Mental Status: She is alert. Mental status is at baseline.      Comments: Hard of hearing   Psychiatric:      Comments: + Dementia, Flat affect        Results Only on 11/06/2020   Component Date Value Ref Range Status     Specimen Description 11/06/2020 Feces   Final     C Diff Toxin B PCR 11/06/2020 Negative  NEG^Negative Final    Comment: Negative: C. difficile target DNA sequences NOT detected, presumed negative   for C.difficile toxin B or the number of bacteria present may be below the   limit of detection for the test.  FDA approved assay performed using [employer] GeneXpert real-time PCR.  A negative result does not exclude actual disease due to C. difficile and may   be due to improper collection, handling and storage of the specimen or the   number of organisms in the specimen is below the detection limit of the assay.             Video-Visit Details    Type of service:  Video Visit    Video End Time: 4:00 PM    Originating Location (pt. Location): Long term Care    Distant Location (provider location):  [facility]     Platform used for Video Visit: Jannie Kilgore MD

## 2021-04-22 NOTE — PROGRESS NOTES
[facility]   [facility]  60 DAY RECERTIFICATION     Patient Name: Tia Garrett  YOB: 1927 Age: 89 year old  Medical RecordNumber: [id number]  Primary Provider: Dr. Kilgore     Advanced Directives: DNR/DNI selective treatment     Date admitted to [facility]: 8/2017        HPI: Patient is seen today for 60 day evaluation for the following medical issues:           Dementia in Alzheimer's disease with early onset with behavioral disturbance (H)  Primary osteoarthritis of right knee  Insomnia due to other mental disorder  Hospice care patient        Pt has been slowly progressing with her dementia/behaviors. She currently resides on a closed dementia unit which is appropriate for her needs.  She continues to have behaviors with cares but pleasant and smiley when talking to her. She responds back but nonsensical. Seems to be having a conversation and responsive but words don't make sense.  She has been sleeping through the night with the melatonin. Currently taking celexa which has improved her moods.   She has decreased intake with meals as she will not allow others to help her eat, closes her mouth.   Occasional pain in knees. Tramadol and tylenol for pain.   No fevers.     -130s.  HR 60-70s    Weight continues to drift. Has lost another 6-10# since Jan 2021.      POLST: currently DNR comfort care which is appropriate given her progressive dementia.        ALLERGIES:  No Known Allergies     Past Medical History:    has no past medical history on file.     Past Surgical History:   has a past surgical history that includes other surgical history; other surgical history; and other surgical history.     Family History:  family history is not on file.     Social History:   reports that she has never smoked. She has never used smokeless tobacco. She reports that she does not drink alcohol.     Immunizations:           Immunization History   Administered Date(s) Administered      Influenza (IIV3) PF 12/14/2012     Pneumo Conj 13-V (2010&after) 05/31/2016     TDAP Vaccine (Boostrix) 05/31/2016         Current Medications:         Current Outpatient Medications   Medication     Acetaminophen 325 MG CAPS     Acetaminophen 325 MG CAPS     ammonium lactate (LAC-HYDRIN) 12 % lotion     HYDROcodone-acetaminophen (NORCO) 5-325 MG tablet     Menthol, Topical Analgesic, (BIOFREEZE) 4 % GEL     risperiDONE (RISPERDAL) 0.5 MG tablet     senna-docusate (SENOKOT-S/PERICOLACE) 8.6-50 MG tablet     traMADol (ULTRAM) 50 MG tablet      No current facility-administered medications for this visit.       Medication reconciliation completed between [facility] record and NH MAR.       Current Diet: [facility] soft diet, thin liquids     Review Of Systems:    Limited ROS due to dementia. See HPI.   Denies pain         Vital Signs: (obtained from nursing home record)  Temp 97.3   Pulse 78   Resp 16   /80   O2 Sats 97%   Weight 114.2 # down about 10# since Jan     Objective:     Pleasantly confused and alert without distress. Some words clear and others are word salad  Sclera nonicteric, conjunctiva non-inflamed.  Skin color pink.   Neck supple and without adenopathy   Lungs Clear  Cardiovascular regular  Abdomen soft and without tenderness   Extremities without edema.   Able to move upper and lower extremities         Assessment and Plan:  (F03.91) Dementia with behavioral disturbance, unspecified dementia type (H)  (primary encounter diagnosis)  Comment: progressive  Plan: Pt is doing well with celexa and melatonin  (much better than when she was taking risperdal).        (M17.11) Primary osteoarthritis of right knee  Comment: chronic  Plan: cont tramadol and tylenol.      (F51.05,  F99) Insomnia due to other mental disorder  Comment: stable  Plan: cont melatonin    (Z51.5) Hospice care patient  Comment: Pt has lost another 10# since Jan. Hospice care appropriate.       POLST is comfort care.      Multidisciplinary  notes, laboratory values, medications, vital signs, weight and orders are reviewed from nursing home records. I have reviewed the patient s medical history and updated the computerized patient record.      A total of 40 minutes was spent with this patient, of which more than 50% was spent in counseling and/or coordination of care.   CRESENCIO Romero, CNP  4/22/2021 12:54 PM

## 2021-05-19 PROBLEM — F33.9 EPISODE OF RECURRENT MAJOR DEPRESSIVE DISORDER (H): Status: ACTIVE | Noted: 2021-01-01

## 2021-05-19 NOTE — PROGRESS NOTES
[facility]  Acute Care Visit    Patient Name: Tia Garrett   : 1927  MRN: [id number]    Place of Service: [facility]  DOS: 2021    CC: decreased appetite, low motivation, sleeping more    HPI:  Tia Garrett is a 89 year old female with PMH of multiple chronic medical concerns, who is seen today regarding pt seems more depressed related to her progressive dementia. Pt had her celexa decreased due to a trial reduction and she is now sleeping more, low motivation, not eating well.   Pt was on [facility] hospice and was discharged because she had been gaining weight and stabilizing but since her decline she is will likely qualify again and family is requesting [facility] hospice this time.     Multidisciplinary notes, laboratory values, medications, vital signs, weight and orders arereviewed from nursing home records. I have reviewed the patient s medical history and updated the computerized patient record.     PMH:  No past medical history on file.    Medications:  Current Outpatient Medications   Medication Sig Dispense Refill     acetaminophen (TYLENOL) 650 MG suppository Place 650 mg rectally every 6 hours as needed       Acetaminophen 325 MG CAPS Take 650 mg by mouth daily as needed And 650 mg q 24 hours PRN pain. 100 capsule      bisacodyl (DULCOLAX) 10 MG suppository Place 1 suppository rectally as needed       citalopram (CELEXA) 10 MG tablet Take 1 tablet (10 mg) by mouth daily       guaiFENesin (ROBITUSSIN) 100 MG/5ML SYRP Take 10 mLs by mouth every 4 hours as needed for cough       hyoscyamine (LEVSIN/SL) 0.125 MG sublingual tablet Take 1 tablet by mouth every 4 hours as needed       LORazepam (ATIVAN) 0.5 MG tablet Take 0.5 mg by mouth every 4 hours as needed       melatonin 1 MG TABS tablet Take 1 tablet (1 mg) by mouth At Bedtime       Menthol, Topical Analgesic, (BIOFREEZE) 4 % GEL        oxyCODONE (ROXICODONE) 5 MG tablet Take 2.5 mg by mouth every 2 hours as needed        polyethylene glycol (MIRALAX) 17 g packet Take 1 packet by mouth daily       senna-docusate (SENOKOT-S/PERICOLACE) 8.6-50 MG tablet Take 1 tablet by mouth as needed       traMADol (ULTRAM) 50 MG tablet TAKE 1/2 TAB (25MG) BY MOUTH THREE TIMES DAILY FOR PAIN 180 tablet 1     Medication reconciliation complete between Epic record and NH MAR.     Allergies:  Allergies   Allergen Reactions     Albuterol Rash     Symbicort Rash     Broke out in rash       Review of Systems:  Limited ROS due to dementia. See HPI.       Vital Signs:  Temp 97.3  Pulse 86   Respirations 17   /96   Oxygen Sats 100%   Weight 129.7#    Physical Exam:     Pleasant and alert without distress. Resting in bed. Smiles, incomprehensible words    Sclera nonicteric, conjunctiva non-inflamed.  Skin color pink.   Lungs clear   Cardiovascular regular  Abdomen soft and without tenderness   Extremities without edema.  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     CRESENCIO Romero, CNP ....................  5/19/2021   6:47 PM

## 2022-10-04 PROBLEM — F03.918 DEMENTIA WITH BEHAVIORAL DISTURBANCE (H): Status: RESOLVED | Noted: 2017-02-02 | Resolved: 2019-11-13

## 2027-11-24 ENCOUNTER — MEDICAL CORRESPONDENCE (OUTPATIENT)
Dept: HEALTH INFORMATION MANAGEMENT | Facility: OTHER | Age: OVER 89
End: 2027-11-24

## (undated) RX ORDER — HYDROCODONE BITARTRATE AND ACETAMINOPHEN 5; 325 MG/1; MG/1
TABLET ORAL
Status: DISPENSED
Start: 2019-05-29